# Patient Record
Sex: FEMALE | Race: BLACK OR AFRICAN AMERICAN | URBAN - METROPOLITAN AREA
[De-identification: names, ages, dates, MRNs, and addresses within clinical notes are randomized per-mention and may not be internally consistent; named-entity substitution may affect disease eponyms.]

---

## 2017-03-08 ENCOUNTER — OP HISTORICAL/CONVERTED ENCOUNTER (OUTPATIENT)
Dept: OTHER | Age: 57
End: 2017-03-08

## 2020-02-10 ENCOUNTER — OP HISTORICAL/CONVERTED ENCOUNTER (OUTPATIENT)
Dept: OTHER | Age: 60
End: 2020-02-10

## 2025-07-30 ENCOUNTER — HOSPITAL ENCOUNTER (INPATIENT)
Facility: HOSPITAL | Age: 65
LOS: 3 days | Discharge: HOME HEALTH | End: 2025-08-02
Admitting: STUDENT IN AN ORGANIZED HEALTH CARE EDUCATION/TRAINING PROGRAM
Payer: MEDICARE

## 2025-07-30 ENCOUNTER — APPOINTMENT (OUTPATIENT)
Facility: HOSPITAL | Age: 65
End: 2025-07-30
Payer: MEDICARE

## 2025-07-30 DIAGNOSIS — E11.65 HYPERGLYCEMIA DUE TO DIABETES MELLITUS (HCC): Primary | ICD-10-CM

## 2025-07-30 DIAGNOSIS — E11.65 TYPE 2 DIABETES MELLITUS WITH HYPERGLYCEMIA (HCC): ICD-10-CM

## 2025-07-30 DIAGNOSIS — E87.29 HIGH ANION GAP METABOLIC ACIDOSIS: ICD-10-CM

## 2025-07-30 PROBLEM — E13.10 SECONDARY DM WITH DKA, UNCONTROLLED (HCC): Status: ACTIVE | Noted: 2025-07-30

## 2025-07-30 LAB
ALBUMIN SERPL-MCNC: 3.6 G/DL (ref 3.5–5)
ALBUMIN/GLOB SERPL: 0.9 (ref 1.1–2.2)
ALP SERPL-CCNC: 190 U/L (ref 45–117)
ALT SERPL-CCNC: 37 U/L (ref 12–78)
ANION GAP SERPL CALC-SCNC: 16 MMOL/L (ref 2–12)
ANION GAP SERPL CALC-SCNC: 18 MMOL/L (ref 2–12)
APPEARANCE UR: CLEAR
AST SERPL W P-5'-P-CCNC: 38 U/L (ref 15–37)
BACTERIA URNS QL MICRO: NEGATIVE /HPF
BASE DEFICIT BLDV-SCNC: 2 MMOL/L
BASOPHILS # BLD: 0.02 K/UL (ref 0–0.1)
BASOPHILS NFR BLD: 0.3 % (ref 0–1)
BILIRUB SERPL-MCNC: 0.8 MG/DL (ref 0.2–1)
BILIRUB UR QL: NEGATIVE
BUN SERPL-MCNC: 20 MG/DL (ref 6–20)
BUN SERPL-MCNC: 20 MG/DL (ref 6–20)
BUN/CREAT SERPL: 11 (ref 12–20)
BUN/CREAT SERPL: 14 (ref 12–20)
CA-I BLD-MCNC: 9.4 MG/DL (ref 8.5–10.1)
CA-I BLD-MCNC: 9.4 MG/DL (ref 8.5–10.1)
CHLORIDE SERPL-SCNC: 101 MMOL/L (ref 97–108)
CHLORIDE SERPL-SCNC: 96 MMOL/L (ref 97–108)
CO2 SERPL-SCNC: 18 MMOL/L (ref 21–32)
CO2 SERPL-SCNC: 19 MMOL/L (ref 21–32)
COLOR UR: ABNORMAL
CREAT SERPL-MCNC: 1.4 MG/DL (ref 0.55–1.02)
CREAT SERPL-MCNC: 1.81 MG/DL (ref 0.55–1.02)
DIFFERENTIAL METHOD BLD: ABNORMAL
EOSINOPHIL # BLD: 0.1 K/UL (ref 0–0.4)
EOSINOPHIL NFR BLD: 1.5 % (ref 0–7)
EPITH CASTS URNS QL MICRO: ABNORMAL /LPF
ERYTHROCYTE [DISTWIDTH] IN BLOOD BY AUTOMATED COUNT: 14 % (ref 11.5–14.5)
GLOBULIN SER CALC-MCNC: 3.8 G/DL (ref 2–4)
GLUCOSE BLD STRIP.AUTO-MCNC: 301 MG/DL (ref 65–100)
GLUCOSE BLD STRIP.AUTO-MCNC: 352 MG/DL (ref 65–100)
GLUCOSE BLD STRIP.AUTO-MCNC: 368 MG/DL (ref 65–100)
GLUCOSE BLD STRIP.AUTO-MCNC: 371 MG/DL (ref 65–100)
GLUCOSE BLD STRIP.AUTO-MCNC: 433 MG/DL (ref 65–100)
GLUCOSE BLD STRIP.AUTO-MCNC: 438 MG/DL (ref 65–100)
GLUCOSE BLD STRIP.AUTO-MCNC: 440 MG/DL (ref 65–100)
GLUCOSE BLD STRIP.AUTO-MCNC: 552 MG/DL (ref 65–100)
GLUCOSE BLD STRIP.AUTO-MCNC: 593 MG/DL (ref 65–100)
GLUCOSE SERPL-MCNC: 445 MG/DL (ref 65–100)
GLUCOSE SERPL-MCNC: 593 MG/DL (ref 65–100)
GLUCOSE UR STRIP.AUTO-MCNC: >500 MG/DL
HCO3 BLDV-SCNC: 19.1 MMOL/L (ref 22–26)
HCT VFR BLD AUTO: 43 % (ref 35–47)
HGB BLD-MCNC: 14.2 G/DL (ref 11.5–16)
HGB UR QL STRIP: NEGATIVE
IMM GRANULOCYTES # BLD AUTO: 0.09 K/UL (ref 0–0.04)
IMM GRANULOCYTES NFR BLD AUTO: 1.4 % (ref 0–0.5)
KETONES UR QL STRIP.AUTO: 5 MG/DL
LACTATE BLD-SCNC: 4.2 MMOL/L (ref 0.4–2)
LACTATE SERPL-SCNC: 4.1 MMOL/L (ref 0.4–2)
LEUKOCYTE ESTERASE UR QL STRIP.AUTO: NEGATIVE
LYMPHOCYTES # BLD: 1.54 K/UL (ref 0.8–3.5)
LYMPHOCYTES NFR BLD: 23.1 % (ref 12–49)
MAGNESIUM SERPL-MCNC: 1.9 MG/DL (ref 1.6–2.4)
MCH RBC QN AUTO: 27.5 PG (ref 26–34)
MCHC RBC AUTO-ENTMCNC: 33 G/DL (ref 30–36.5)
MCV RBC AUTO: 83.2 FL (ref 80–99)
MONOCYTES # BLD: 0.69 K/UL (ref 0–1)
MONOCYTES NFR BLD: 10.4 % (ref 5–13)
NEUTS SEG # BLD: 4.22 K/UL (ref 1.8–8)
NEUTS SEG NFR BLD: 63.3 % (ref 32–75)
NITRITE UR QL STRIP.AUTO: NEGATIVE
NRBC # BLD: 0 K/UL (ref 0–0.01)
NRBC BLD-RTO: 0 PER 100 WBC
PCO2 BLDV: 24.4 MMHG (ref 41–52)
PERFORMED BY:: ABNORMAL
PH BLDV: 7.5 (ref 7.23–7.44)
PH UR STRIP: 5 (ref 5–8)
PLATELET # BLD AUTO: 347 K/UL (ref 150–400)
PMV BLD AUTO: 10.1 FL (ref 8.9–12.9)
PO2 BLDV: 47 MMHG (ref 25–40)
POTASSIUM SERPL-SCNC: 3.4 MMOL/L (ref 3.5–5.1)
POTASSIUM SERPL-SCNC: 3.6 MMOL/L (ref 3.5–5.1)
PROT SERPL-MCNC: 7.4 G/DL (ref 6.4–8.2)
PROT UR STRIP-MCNC: NEGATIVE MG/DL
RBC # BLD AUTO: 5.17 M/UL (ref 3.8–5.2)
RBC #/AREA URNS HPF: ABNORMAL /HPF (ref 0–5)
SAO2 % BLDV: 87.3 %
SODIUM SERPL-SCNC: 132 MMOL/L (ref 136–145)
SODIUM SERPL-SCNC: 136 MMOL/L (ref 136–145)
SP GR UR REFRACTOMETRY: 1.02 (ref 1–1.03)
SPECIMEN SITE: ABNORMAL
SPECIMEN TYPE: ABNORMAL
TROPONIN I SERPL HS-MCNC: 16 NG/L (ref 0–51)
URINE CULTURE IF INDICATED: ABNORMAL
UROBILINOGEN UR QL STRIP.AUTO: 0.1 EU/DL (ref 0.1–1)
WBC # BLD AUTO: 6.7 K/UL (ref 3.6–11)
WBC URNS QL MICRO: ABNORMAL /HPF (ref 0–4)

## 2025-07-30 PROCEDURE — 83930 ASSAY OF BLOOD OSMOLALITY: CPT

## 2025-07-30 PROCEDURE — 82962 GLUCOSE BLOOD TEST: CPT

## 2025-07-30 PROCEDURE — 6370000000 HC RX 637 (ALT 250 FOR IP): Performed by: STUDENT IN AN ORGANIZED HEALTH CARE EDUCATION/TRAINING PROGRAM

## 2025-07-30 PROCEDURE — 83605 ASSAY OF LACTIC ACID: CPT

## 2025-07-30 PROCEDURE — 2500000003 HC RX 250 WO HCPCS: Performed by: STUDENT IN AN ORGANIZED HEALTH CARE EDUCATION/TRAINING PROGRAM

## 2025-07-30 PROCEDURE — 80048 BASIC METABOLIC PNL TOTAL CA: CPT

## 2025-07-30 PROCEDURE — 87641 MR-STAPH DNA AMP PROBE: CPT

## 2025-07-30 PROCEDURE — 85025 COMPLETE CBC W/AUTO DIFF WBC: CPT

## 2025-07-30 PROCEDURE — 99285 EMERGENCY DEPT VISIT HI MDM: CPT

## 2025-07-30 PROCEDURE — 83735 ASSAY OF MAGNESIUM: CPT

## 2025-07-30 PROCEDURE — 71045 X-RAY EXAM CHEST 1 VIEW: CPT

## 2025-07-30 PROCEDURE — 80047 BASIC METABLC PNL IONIZED CA: CPT

## 2025-07-30 PROCEDURE — 73560 X-RAY EXAM OF KNEE 1 OR 2: CPT

## 2025-07-30 PROCEDURE — 84484 ASSAY OF TROPONIN QUANT: CPT

## 2025-07-30 PROCEDURE — 81001 URINALYSIS AUTO W/SCOPE: CPT

## 2025-07-30 PROCEDURE — 96360 HYDRATION IV INFUSION INIT: CPT

## 2025-07-30 PROCEDURE — 96361 HYDRATE IV INFUSION ADD-ON: CPT

## 2025-07-30 PROCEDURE — 2580000003 HC RX 258

## 2025-07-30 PROCEDURE — 2580000003 HC RX 258: Performed by: STUDENT IN AN ORGANIZED HEALTH CARE EDUCATION/TRAINING PROGRAM

## 2025-07-30 PROCEDURE — 6360000002 HC RX W HCPCS: Performed by: STUDENT IN AN ORGANIZED HEALTH CARE EDUCATION/TRAINING PROGRAM

## 2025-07-30 PROCEDURE — 80053 COMPREHEN METABOLIC PANEL: CPT

## 2025-07-30 PROCEDURE — 82010 KETONE BODYS QUAN: CPT

## 2025-07-30 PROCEDURE — 2000000000 HC ICU R&B

## 2025-07-30 RX ORDER — INSULIN LISPRO 100 [IU]/ML
10 INJECTION, SOLUTION INTRAVENOUS; SUBCUTANEOUS
Status: ON HOLD | COMMUNITY
End: 2025-08-02 | Stop reason: HOSPADM

## 2025-07-30 RX ORDER — ACETAMINOPHEN 650 MG/1
650 SUPPOSITORY RECTAL EVERY 6 HOURS PRN
Status: DISCONTINUED | OUTPATIENT
Start: 2025-07-30 | End: 2025-08-02 | Stop reason: HOSPADM

## 2025-07-30 RX ORDER — HEPARIN SODIUM 5000 [USP'U]/ML
5000 INJECTION, SOLUTION INTRAVENOUS; SUBCUTANEOUS EVERY 8 HOURS SCHEDULED
Status: DISCONTINUED | OUTPATIENT
Start: 2025-07-30 | End: 2025-08-02 | Stop reason: HOSPADM

## 2025-07-30 RX ORDER — DEXTROSE MONOHYDRATE 100 MG/ML
INJECTION, SOLUTION INTRAVENOUS CONTINUOUS PRN
Status: DISCONTINUED | OUTPATIENT
Start: 2025-07-30 | End: 2025-08-02 | Stop reason: HOSPADM

## 2025-07-30 RX ORDER — GLUCAGON 1 MG/ML
1 KIT INJECTION PRN
Status: DISCONTINUED | OUTPATIENT
Start: 2025-07-30 | End: 2025-07-31

## 2025-07-30 RX ORDER — CLONIDINE HYDROCHLORIDE 0.3 MG/1
0.3 TABLET ORAL 3 TIMES DAILY
Status: ON HOLD | COMMUNITY
Start: 2025-07-01 | End: 2025-08-01

## 2025-07-30 RX ORDER — ONDANSETRON 2 MG/ML
4 INJECTION INTRAMUSCULAR; INTRAVENOUS EVERY 6 HOURS PRN
Status: DISCONTINUED | OUTPATIENT
Start: 2025-07-30 | End: 2025-08-02 | Stop reason: HOSPADM

## 2025-07-30 RX ORDER — ENOXAPARIN SODIUM 100 MG/ML
30 INJECTION SUBCUTANEOUS 2 TIMES DAILY
Status: DISCONTINUED | OUTPATIENT
Start: 2025-07-30 | End: 2025-07-30

## 2025-07-30 RX ORDER — ASPIRIN 81 MG/1
81 TABLET ORAL DAILY
COMMUNITY

## 2025-07-30 RX ORDER — INSULIN LISPRO 100 [IU]/ML
0-8 INJECTION, SOLUTION INTRAVENOUS; SUBCUTANEOUS
Status: DISCONTINUED | OUTPATIENT
Start: 2025-07-30 | End: 2025-07-31

## 2025-07-30 RX ORDER — ASPIRIN 81 MG/1
81 TABLET, CHEWABLE ORAL DAILY
Status: DISCONTINUED | OUTPATIENT
Start: 2025-07-30 | End: 2025-08-02 | Stop reason: HOSPADM

## 2025-07-30 RX ORDER — SODIUM CHLORIDE 9 MG/ML
INJECTION, SOLUTION INTRAVENOUS PRN
Status: DISCONTINUED | OUTPATIENT
Start: 2025-07-30 | End: 2025-08-02 | Stop reason: HOSPADM

## 2025-07-30 RX ORDER — AMLODIPINE BESYLATE 10 MG/1
10 TABLET ORAL DAILY
COMMUNITY
Start: 2025-07-29

## 2025-07-30 RX ORDER — ONDANSETRON 4 MG/1
4 TABLET, ORALLY DISINTEGRATING ORAL EVERY 8 HOURS PRN
Status: DISCONTINUED | OUTPATIENT
Start: 2025-07-30 | End: 2025-08-02 | Stop reason: HOSPADM

## 2025-07-30 RX ORDER — MAGNESIUM SULFATE IN WATER 40 MG/ML
2000 INJECTION, SOLUTION INTRAVENOUS PRN
Status: DISCONTINUED | OUTPATIENT
Start: 2025-07-30 | End: 2025-07-31

## 2025-07-30 RX ORDER — ACETAMINOPHEN 325 MG/1
650 TABLET ORAL EVERY 6 HOURS PRN
Status: DISCONTINUED | OUTPATIENT
Start: 2025-07-30 | End: 2025-08-02 | Stop reason: HOSPADM

## 2025-07-30 RX ORDER — INSULIN GLARGINE 100 [IU]/ML
15 INJECTION, SOLUTION SUBCUTANEOUS 2 TIMES DAILY
Status: DISCONTINUED | OUTPATIENT
Start: 2025-07-30 | End: 2025-07-31

## 2025-07-30 RX ORDER — POLYETHYLENE GLYCOL 3350 17 G/17G
17 POWDER, FOR SOLUTION ORAL DAILY PRN
Status: DISCONTINUED | OUTPATIENT
Start: 2025-07-30 | End: 2025-08-02 | Stop reason: HOSPADM

## 2025-07-30 RX ORDER — 0.9 % SODIUM CHLORIDE 0.9 %
1000 INTRAVENOUS SOLUTION INTRAVENOUS ONCE
Status: COMPLETED | OUTPATIENT
Start: 2025-07-30 | End: 2025-07-30

## 2025-07-30 RX ORDER — AMLODIPINE BESYLATE 5 MG/1
10 TABLET ORAL DAILY
Status: DISCONTINUED | OUTPATIENT
Start: 2025-07-30 | End: 2025-08-02 | Stop reason: HOSPADM

## 2025-07-30 RX ORDER — POTASSIUM CHLORIDE 1500 MG/1
20 TABLET, EXTENDED RELEASE ORAL ONCE
Status: COMPLETED | OUTPATIENT
Start: 2025-07-30 | End: 2025-07-30

## 2025-07-30 RX ORDER — MONTELUKAST SODIUM 4 MG/1
4 TABLET, CHEWABLE ORAL NIGHTLY
Status: DISCONTINUED | OUTPATIENT
Start: 2025-07-30 | End: 2025-07-30

## 2025-07-30 RX ORDER — METOPROLOL TARTRATE 100 MG/1
150 TABLET ORAL 2 TIMES DAILY
Status: ON HOLD | COMMUNITY
Start: 2025-07-29 | End: 2025-08-01 | Stop reason: HOSPADM

## 2025-07-30 RX ORDER — SODIUM CHLORIDE 0.9 % (FLUSH) 0.9 %
5-40 SYRINGE (ML) INJECTION PRN
Status: DISCONTINUED | OUTPATIENT
Start: 2025-07-30 | End: 2025-08-02 | Stop reason: HOSPADM

## 2025-07-30 RX ORDER — SODIUM CHLORIDE 0.9 % (FLUSH) 0.9 %
5-40 SYRINGE (ML) INJECTION EVERY 12 HOURS SCHEDULED
Status: DISCONTINUED | OUTPATIENT
Start: 2025-07-30 | End: 2025-08-02 | Stop reason: HOSPADM

## 2025-07-30 RX ORDER — CHLORTHALIDONE 25 MG/1
25 TABLET ORAL DAILY
Status: ON HOLD | COMMUNITY
End: 2025-08-01 | Stop reason: HOSPADM

## 2025-07-30 RX ORDER — ATORVASTATIN CALCIUM 10 MG/1
20 TABLET, FILM COATED ORAL NIGHTLY
Status: DISCONTINUED | OUTPATIENT
Start: 2025-07-30 | End: 2025-08-02 | Stop reason: HOSPADM

## 2025-07-30 RX ORDER — POTASSIUM CHLORIDE 7.45 MG/ML
10 INJECTION INTRAVENOUS PRN
Status: DISCONTINUED | OUTPATIENT
Start: 2025-07-30 | End: 2025-07-31

## 2025-07-30 RX ORDER — SODIUM CHLORIDE, SODIUM LACTATE, POTASSIUM CHLORIDE, AND CALCIUM CHLORIDE .6; .31; .03; .02 G/100ML; G/100ML; G/100ML; G/100ML
2000 INJECTION, SOLUTION INTRAVENOUS ONCE
Status: COMPLETED | OUTPATIENT
Start: 2025-07-30 | End: 2025-07-30

## 2025-07-30 RX ORDER — ATORVASTATIN CALCIUM 20 MG/1
20 TABLET, FILM COATED ORAL EVERY EVENING
COMMUNITY
Start: 2025-07-01

## 2025-07-30 RX ORDER — MONTELUKAST SODIUM 5 MG/1
5 TABLET, CHEWABLE ORAL NIGHTLY
Status: DISCONTINUED | OUTPATIENT
Start: 2025-07-30 | End: 2025-08-02 | Stop reason: HOSPADM

## 2025-07-30 RX ORDER — INSULIN GLARGINE 100 [IU]/ML
10 INJECTION, SOLUTION SUBCUTANEOUS 2 TIMES DAILY
Status: ON HOLD | COMMUNITY
Start: 2025-07-29 | End: 2025-08-01

## 2025-07-30 RX ORDER — POTASSIUM CHLORIDE 29.8 MG/ML
20 INJECTION INTRAVENOUS PRN
Status: DISCONTINUED | OUTPATIENT
Start: 2025-07-30 | End: 2025-07-31

## 2025-07-30 RX ADMIN — POTASSIUM CHLORIDE 20 MEQ: 1500 TABLET, EXTENDED RELEASE ORAL at 18:44

## 2025-07-30 RX ADMIN — ONDANSETRON 4 MG: 2 INJECTION, SOLUTION INTRAMUSCULAR; INTRAVENOUS at 18:25

## 2025-07-30 RX ADMIN — ATORVASTATIN CALCIUM 20 MG: 20 TABLET, FILM COATED ORAL at 21:36

## 2025-07-30 RX ADMIN — HEPARIN SODIUM 5000 UNITS: 5000 INJECTION, SOLUTION INTRAVENOUS; SUBCUTANEOUS at 21:37

## 2025-07-30 RX ADMIN — AMLODIPINE BESYLATE 10 MG: 5 TABLET ORAL at 18:44

## 2025-07-30 RX ADMIN — SODIUM BICARBONATE: 84 INJECTION, SOLUTION INTRAVENOUS at 21:09

## 2025-07-30 RX ADMIN — SODIUM CHLORIDE, PRESERVATIVE FREE 10 ML: 5 INJECTION INTRAVENOUS at 21:38

## 2025-07-30 RX ADMIN — SODIUM CHLORIDE 1000 ML: 0.9 INJECTION, SOLUTION INTRAVENOUS at 14:39

## 2025-07-30 RX ADMIN — INSULIN LISPRO 8 UNITS: 100 INJECTION, SOLUTION INTRAVENOUS; SUBCUTANEOUS at 21:37

## 2025-07-30 RX ADMIN — SODIUM CHLORIDE, SODIUM LACTATE, POTASSIUM CHLORIDE, AND CALCIUM CHLORIDE 2000 ML: .6; .31; .03; .02 INJECTION, SOLUTION INTRAVENOUS at 18:32

## 2025-07-30 RX ADMIN — MONTELUKAST SODIUM 5 MG: 5 TABLET, CHEWABLE ORAL at 21:36

## 2025-07-30 RX ADMIN — HEPARIN SODIUM 5000 UNITS: 5000 INJECTION, SOLUTION INTRAVENOUS; SUBCUTANEOUS at 18:36

## 2025-07-30 RX ADMIN — ASPIRIN 81 MG: 81 TABLET, CHEWABLE ORAL at 18:44

## 2025-07-30 RX ADMIN — INSULIN GLARGINE 15 UNITS: 100 INJECTION, SOLUTION SUBCUTANEOUS at 21:36

## 2025-07-30 ASSESSMENT — LIFESTYLE VARIABLES
HOW OFTEN DO YOU HAVE A DRINK CONTAINING ALCOHOL: NEVER
HOW MANY STANDARD DRINKS CONTAINING ALCOHOL DO YOU HAVE ON A TYPICAL DAY: PATIENT DOES NOT DRINK

## 2025-07-30 ASSESSMENT — PAIN SCALES - GENERAL
PAINLEVEL_OUTOF10: 0
PAINLEVEL_OUTOF10: 0

## 2025-07-30 NOTE — ED NOTES
ED TO INPATIENT SBAR HANDOFF     Patient Name: Geetha Emery   Preferred Name: Geetha  : 1960  65 y.o.             Family/Caregiver Present: yes   Code Status Order: Full Code  PO Status:[unfilled]  Telemetry Order: Yes  C-SSRS: Risk of Suicide: No Risk  Sitter no   Restraints:    Sepsis Risk Score Sepsis V2 Risk Score: 15.8    Situation:  Chief Complaint   Patient presents with    Hyperglycemia    Fall     Brief Description of Patient's Condition: COPD exacerbation with possible pneumonia. Pt seen for trip on rug, hyperglycemia, non med compliance. Ambulated to bathroom with one assist, no wounds, from home uses cane at times.  Mental Status: oriented  Arrived from: Home  Abnormal labs:   Abnormal Labs Reviewed   CBC WITH AUTO DIFFERENTIAL - Abnormal; Notable for the following components:       Result Value    Immature Granulocytes % 1.4 (*)     Immature Granulocytes Absolute 0.09 (*)     All other components within normal limits   BASIC METABOLIC PANEL - Abnormal; Notable for the following components:    Sodium 132 (*)     Potassium 3.4 (*)     Chloride 96 (*)     CO2 18 (*)     Anion Gap 18 (*)     Glucose 593 (*)     Creatinine 1.81 (*)     BUN/Creatinine Ratio 11 (*)     Est, Glom Filt Rate 30 (*)     All other components within normal limits   URINALYSIS WITH REFLEX TO CULTURE - Abnormal; Notable for the following components:    Glucose, Ur >500 (*)     Ketones, Urine 5 (*)     All other components within normal limits   POCT GLUCOSE - Abnormal; Notable for the following components:    POC Glucose 552 (*)     All other components within normal limits   VENOUS BLOOD GAS, POINT OF CARE - Abnormal; Notable for the following components:    PH, VENOUS (POC) 7.50 (*)     PCO2, [place], POC 24.4 (*)     PO2, VENOUS (POC) 47 (*)     HCO3, Venous 19.1 (*)     All other components within normal limits   POC CHEMISTRY (NA,K,ICA,GLU,CALC HCT/HGB,LACTATE,CREA,CL) - Abnormal; Notable for the following components:    POC  Glucose 593 (*)     POC Lactic Acid 4.20 (*)     All other components within normal limits   POCT GLUCOSE - Abnormal; Notable for the following components:    POC Glucose 433 (*)     All other components within normal limits        Background:  Allergies: No Known Allergies  History: History reviewed. No pertinent past medical history.     Assessment:  Vitals/MEWS:        Vitals:    07/30/25 1400 07/30/25 1407 07/30/25 1600 07/30/25 1615   BP: (!) 140/86 (!) 157/89 139/75 (!) 128/59   Pulse: 71 77 78 68   Resp: 16 18 14 15   Temp:       SpO2: 99% 98% 100% 98%   Weight:       Height:         Cardiac Rhythm:       Deterioration Index:  O2 Flow Rate:    O2 Device: O2 Device: None (Room air)  Critical Lab Results: [unfilled]  Cultures:   NIH Score: NIH     Active LDA's:   Patient Lines/Drains/Airways Status       Active LDAs       Name Placement date Placement time Site Days    Peripheral IV 07/30/25 Left Antecubital 07/30/25  --  Antecubital  less than 1                  Active Central Lines:                            Active Wounds:    Active Del Valle's:    Active Feeding Tubes:        Administered Medications:   Medications   sodium chloride flush 0.9 % injection 5-40 mL (has no administration in time range)   sodium chloride flush 0.9 % injection 5-40 mL (has no administration in time range)   0.9 % sodium chloride infusion (has no administration in time range)   potassium chloride 20 mEq/50 mL IVPB (Central Line) (has no administration in time range)     Or   potassium chloride 10 mEq/100 mL IVPB (Peripheral Line) (has no administration in time range)   magnesium sulfate 2000 mg in 50 mL IVPB premix (has no administration in time range)   ondansetron (ZOFRAN-ODT) disintegrating tablet 4 mg (has no administration in time range)     Or   ondansetron (ZOFRAN) injection 4 mg (has no administration in time range)   polyethylene glycol (GLYCOLAX) packet 17 g (has no administration in time range)   acetaminophen (TYLENOL)

## 2025-07-30 NOTE — H&P
the decision-making and personally managed or directed the management of the following life and organ supporting interventions that required my frequent assessment to treat or prevent imminent deterioration.    I personally spent 60 minutes of critical care time.  This is time spent at this critically ill patient's bedside actively involved in patient care as well as the coordination of care.  This does not include any procedural time which has been billed separately.    Dr. Surjit Malagon  Intensivist

## 2025-07-30 NOTE — ED PROVIDER NOTES
[facility] EMERGENCY DEPT  EMERGENCY DEPARTMENT HISTORY AND PHYSICAL EXAM      Date of evaluation: 7/30/2025  Patient Name: Geetha Emery  Birthdate 1960  MRN: [id number]  ED Provider: Celena Clifton MD   Note Started: 11:43 AM EDT 7/30/25    HISTORY OF PRESENT ILLNESS     Chief Complaint   Patient presents with    Hyperglycemia    Fall       History Provided By: Patient, EMS     HPI: Geetha Emery is a 65 y.o. female with history of poorly controlled diabetes and hypertension presents to hospital via EMS after patient sustained a mechanical ground-level fall today and was not able to get up on her own.  Patient reports that her foot caught a rug in her home causing her to fall, no sensation of dizziness, lightheadedness, headache, chest pain occurring prior to fall.  Patient is a very poor historian.  Per EMS patient had glucose reading high on their arrival.  Patient unable to state which medication she takes but states that she has not been taking her NovoLog and has been taking her Lantus \"3 times per day\" but then later states that she is not taking it at all.  She states that she did not strike her head, not on blood thinners, no LOC.  She endorses mild discomfort to her left knee however states that she is unable to bear weight and walk to the restroom without difficulty without pain.  Denies any infectious symptoms including fever, chills, chest pain, shortness of breath, abdominal pain, nausea, vomiting.  States that she has no other complaints at this time.    PAST MEDICAL HISTORY   Past Medical History:  History reviewed. No pertinent past medical history.    Past Surgical History:  History reviewed. No pertinent surgical history.    Family History:  History reviewed. No pertinent family history.    Social History:  Social History     Tobacco Use    Smoking status: Never    Smokeless tobacco: Never   Vaping Use    Vaping status: Never Used   Substance Use Topics    Alcohol use: Never    Drug use: Never        Allergies:  No Known Allergies    PCP: Berkley Rodarte MD    Current Meds:   Current Facility-Administered Medications   Medication Dose Route Frequency Provider Last Rate Last Admin    sodium chloride flush 0.9 % injection 5-40 mL  5-40 mL IntraVENous 2 times per day Surjit Malagon MD        sodium chloride flush 0.9 % injection 5-40 mL  5-40 mL IntraVENous PRN Surjit Malagon MD        0.9 % sodium chloride infusion   IntraVENous PRN Surjit Malagon MD        potassium chloride 20 mEq/50 mL IVPB (Central Line)  20 mEq IntraVENous PRN Surjit Malagon MD        Or    potassium chloride 10 mEq/100 mL IVPB (Peripheral Line)  10 mEq IntraVENous PRN Surjit Malagon MD        magnesium sulfate 2000 mg in 50 mL IVPB premix  2,000 mg IntraVENous PRN Surjit Malagon MD        ondansetron (ZOFRAN-ODT) disintegrating tablet 4 mg  4 mg Oral Q8H PRN Surjit Malagon MD        Or    ondansetron (ZOFRAN) injection 4 mg  4 mg IntraVENous Q6H PRN Surjit Malagon MD   4 mg at 07/30/25 1825    polyethylene glycol (GLYCOLAX) packet 17 g  17 g Oral Daily PRN Surjit Malagon MD        acetaminophen (TYLENOL) tablet 650 mg  650 mg Oral Q6H PRN Surjit Malagon MD        Or    acetaminophen (TYLENOL) suppository 650 mg  650 mg Rectal Q6H PRN Surjit Malagon MD        heparin (porcine) injection 5,000 Units  5,000 Units SubCUTAneous 3 times per day Surjit Malagon MD   5,000 Units at 07/30/25 1836    sodium bicarbonate 150 mEq in sterile water 1,000 mL infusion   IntraVENous Continuous Surjit Malagon MD        lactated ringers bolus 2,000 mL  2,000 mL IntraVENous Once Surjit Malagon .7 mL/hr at 07/30/25 1832 2,000 mL at 07/30/25 1832    glucose chewable tablet 16 g  4 tablet Oral PRN Surjit Malagon MD        dextrose bolus 10% 125 mL  125 mL IntraVENous PRN Surjit Malagon MD        Or    dextrose bolus 10% 250 mL  250 mL IntraVENous PRN Surjit Malagon MD        glucagon injection 1 mg  1 mg SubCUTAneous PRN Surjit Malagon MD        dextrose 10 % infusion   IntraVENous

## 2025-07-30 NOTE — ED TRIAGE NOTES
Per EMS pt stated her foot got caught in the carpet/rug, no LOC did not hit head. Pt had med bottles set on counters, reported to EMS she has not been taking meds or checking BGL.  20G Rt wrist w saline bouls.     BGL HI, recheck of 598

## 2025-07-31 LAB
ALBUMIN SERPL-MCNC: 2.9 G/DL (ref 3.5–5)
ALBUMIN SERPL-MCNC: 3 G/DL (ref 3.5–5)
ALBUMIN/GLOB SERPL: 0.7 (ref 1.1–2.2)
ALBUMIN/GLOB SERPL: 0.8 (ref 1.1–2.2)
ALP SERPL-CCNC: 158 U/L (ref 45–117)
ALP SERPL-CCNC: 161 U/L (ref 45–117)
ALT SERPL-CCNC: 34 U/L (ref 12–78)
ALT SERPL-CCNC: 35 U/L (ref 12–78)
ANION GAP SERPL CALC-SCNC: 12 MMOL/L (ref 2–12)
ANION GAP SERPL CALC-SCNC: 12 MMOL/L (ref 2–12)
ANION GAP SERPL CALC-SCNC: 13 MMOL/L (ref 2–12)
ANION GAP SERPL CALC-SCNC: 14 MMOL/L (ref 2–12)
APAP SERPL-MCNC: <2 UG/ML (ref 10–30)
AST SERPL W P-5'-P-CCNC: 32 U/L (ref 15–37)
AST SERPL W P-5'-P-CCNC: 36 U/L (ref 15–37)
BILIRUB SERPL-MCNC: 0.7 MG/DL (ref 0.2–1)
BILIRUB SERPL-MCNC: 0.7 MG/DL (ref 0.2–1)
BUN SERPL-MCNC: 10 MG/DL (ref 6–20)
BUN SERPL-MCNC: 11 MG/DL (ref 6–20)
BUN SERPL-MCNC: 14 MG/DL (ref 6–20)
BUN SERPL-MCNC: 17 MG/DL (ref 6–20)
BUN/CREAT SERPL: 10 (ref 12–20)
BUN/CREAT SERPL: 12 (ref 12–20)
BUN/CREAT SERPL: 14 (ref 12–20)
BUN/CREAT SERPL: 7 (ref 12–20)
CA-I BLD-MCNC: 9 MG/DL (ref 8.5–10.1)
CA-I BLD-MCNC: 9 MG/DL (ref 8.5–10.1)
CA-I BLD-MCNC: 9.1 MG/DL (ref 8.5–10.1)
CA-I BLD-MCNC: 9.4 MG/DL (ref 8.5–10.1)
CHLORIDE SERPL-SCNC: 101 MMOL/L (ref 97–108)
CHLORIDE SERPL-SCNC: 104 MMOL/L (ref 97–108)
CHLORIDE SERPL-SCNC: 96 MMOL/L (ref 97–108)
CHLORIDE SERPL-SCNC: 98 MMOL/L (ref 97–108)
CO2 SERPL-SCNC: 21 MMOL/L (ref 21–32)
CO2 SERPL-SCNC: 22 MMOL/L (ref 21–32)
CO2 SERPL-SCNC: 26 MMOL/L (ref 21–32)
CO2 SERPL-SCNC: 26 MMOL/L (ref 21–32)
CREAT SERPL-MCNC: 1.05 MG/DL (ref 0.55–1.02)
CREAT SERPL-MCNC: 1.15 MG/DL (ref 0.55–1.02)
CREAT SERPL-MCNC: 1.2 MG/DL (ref 0.55–1.02)
CREAT SERPL-MCNC: 1.35 MG/DL (ref 0.55–1.02)
DATE LAST DOSE: ABNORMAL
DATE LAST DOSE: ABNORMAL
DOSE AMOUNT: ABNORMAL UNITS
DOSE AMOUNT: ABNORMAL UNITS
ERYTHROCYTE [DISTWIDTH] IN BLOOD BY AUTOMATED COUNT: 14 % (ref 11.5–14.5)
EST. AVERAGE GLUCOSE BLD GHB EST-MCNC: 361 MG/DL
GLOBULIN SER CALC-MCNC: 3.8 G/DL (ref 2–4)
GLOBULIN SER CALC-MCNC: 4.1 G/DL (ref 2–4)
GLUCOSE BLD STRIP.AUTO-MCNC: 228 MG/DL (ref 65–100)
GLUCOSE BLD STRIP.AUTO-MCNC: 230 MG/DL (ref 65–100)
GLUCOSE BLD STRIP.AUTO-MCNC: 232 MG/DL (ref 65–100)
GLUCOSE BLD STRIP.AUTO-MCNC: 233 MG/DL (ref 65–100)
GLUCOSE BLD STRIP.AUTO-MCNC: 237 MG/DL (ref 65–100)
GLUCOSE BLD STRIP.AUTO-MCNC: 240 MG/DL (ref 65–100)
GLUCOSE BLD STRIP.AUTO-MCNC: 242 MG/DL (ref 65–100)
GLUCOSE BLD STRIP.AUTO-MCNC: 251 MG/DL (ref 65–100)
GLUCOSE BLD STRIP.AUTO-MCNC: 256 MG/DL (ref 65–100)
GLUCOSE BLD STRIP.AUTO-MCNC: 265 MG/DL (ref 65–100)
GLUCOSE BLD STRIP.AUTO-MCNC: 268 MG/DL (ref 65–100)
GLUCOSE BLD STRIP.AUTO-MCNC: 276 MG/DL (ref 65–100)
GLUCOSE SERPL-MCNC: 244 MG/DL (ref 65–100)
GLUCOSE SERPL-MCNC: 248 MG/DL (ref 65–100)
GLUCOSE SERPL-MCNC: 255 MG/DL (ref 65–100)
GLUCOSE SERPL-MCNC: 323 MG/DL (ref 65–100)
HBA1C MFR BLD: 14.2 % (ref 4–5.6)
HCT VFR BLD AUTO: 42.8 % (ref 35–47)
HGB BLD-MCNC: 14.2 G/DL (ref 11.5–16)
LACTATE SERPL-SCNC: 2.6 MMOL/L (ref 0.4–2)
LACTATE SERPL-SCNC: 3 MMOL/L (ref 0.4–2)
LACTATE SERPL-SCNC: 3.6 MMOL/L (ref 0.4–2)
LACTATE SERPL-SCNC: 3.8 MMOL/L (ref 0.4–2)
MAGNESIUM SERPL-MCNC: 1.6 MG/DL (ref 1.6–2.4)
MAGNESIUM SERPL-MCNC: 2 MG/DL (ref 1.6–2.4)
MCH RBC QN AUTO: 27.6 PG (ref 26–34)
MCHC RBC AUTO-ENTMCNC: 33.2 G/DL (ref 30–36.5)
MCV RBC AUTO: 83.1 FL (ref 80–99)
MRSA DNA SPEC QL NAA+PROBE: NOT DETECTED
NRBC # BLD: 0 K/UL (ref 0–0.01)
NRBC BLD-RTO: 0 PER 100 WBC
PERFORMED BY:: ABNORMAL
PHOSPHATE SERPL-MCNC: 2.1 MG/DL (ref 2.6–4.7)
PHOSPHATE SERPL-MCNC: 2.1 MG/DL (ref 2.6–4.7)
PLATELET # BLD AUTO: 309 K/UL (ref 150–400)
PMV BLD AUTO: 9.8 FL (ref 8.9–12.9)
POTASSIUM SERPL-SCNC: 3.5 MMOL/L (ref 3.5–5.1)
POTASSIUM SERPL-SCNC: 3.5 MMOL/L (ref 3.5–5.1)
POTASSIUM SERPL-SCNC: 3.8 MMOL/L (ref 3.5–5.1)
POTASSIUM SERPL-SCNC: 4 MMOL/L (ref 3.5–5.1)
PROT SERPL-MCNC: 6.8 G/DL (ref 6.4–8.2)
PROT SERPL-MCNC: 7 G/DL (ref 6.4–8.2)
RBC # BLD AUTO: 5.15 M/UL (ref 3.8–5.2)
SALICYLATES SERPL-MCNC: <1.7 MG/DL (ref 2.8–20)
SODIUM SERPL-SCNC: 135 MMOL/L (ref 136–145)
SODIUM SERPL-SCNC: 136 MMOL/L (ref 136–145)
SODIUM SERPL-SCNC: 137 MMOL/L (ref 136–145)
SODIUM SERPL-SCNC: 137 MMOL/L (ref 136–145)
WBC # BLD AUTO: 7.6 K/UL (ref 3.6–11)

## 2025-07-31 PROCEDURE — 6370000000 HC RX 637 (ALT 250 FOR IP): Performed by: STUDENT IN AN ORGANIZED HEALTH CARE EDUCATION/TRAINING PROGRAM

## 2025-07-31 PROCEDURE — 83735 ASSAY OF MAGNESIUM: CPT

## 2025-07-31 PROCEDURE — 83605 ASSAY OF LACTIC ACID: CPT

## 2025-07-31 PROCEDURE — 36415 COLL VENOUS BLD VENIPUNCTURE: CPT

## 2025-07-31 PROCEDURE — 80179 DRUG ASSAY SALICYLATE: CPT

## 2025-07-31 PROCEDURE — 82962 GLUCOSE BLOOD TEST: CPT

## 2025-07-31 PROCEDURE — 84100 ASSAY OF PHOSPHORUS: CPT

## 2025-07-31 PROCEDURE — 80053 COMPREHEN METABOLIC PANEL: CPT

## 2025-07-31 PROCEDURE — 80143 DRUG ASSAY ACETAMINOPHEN: CPT

## 2025-07-31 PROCEDURE — 2580000003 HC RX 258: Performed by: NURSE PRACTITIONER

## 2025-07-31 PROCEDURE — 6360000002 HC RX W HCPCS: Performed by: NURSE PRACTITIONER

## 2025-07-31 PROCEDURE — 1100000000 HC RM PRIVATE

## 2025-07-31 PROCEDURE — 6370000000 HC RX 637 (ALT 250 FOR IP): Performed by: INTERNAL MEDICINE

## 2025-07-31 PROCEDURE — 6360000002 HC RX W HCPCS: Performed by: STUDENT IN AN ORGANIZED HEALTH CARE EDUCATION/TRAINING PROGRAM

## 2025-07-31 PROCEDURE — 6370000000 HC RX 637 (ALT 250 FOR IP): Performed by: NURSE PRACTITIONER

## 2025-07-31 PROCEDURE — 2580000003 HC RX 258: Performed by: STUDENT IN AN ORGANIZED HEALTH CARE EDUCATION/TRAINING PROGRAM

## 2025-07-31 PROCEDURE — 83036 HEMOGLOBIN GLYCOSYLATED A1C: CPT

## 2025-07-31 PROCEDURE — 80048 BASIC METABOLIC PNL TOTAL CA: CPT

## 2025-07-31 PROCEDURE — 2500000003 HC RX 250 WO HCPCS: Performed by: STUDENT IN AN ORGANIZED HEALTH CARE EDUCATION/TRAINING PROGRAM

## 2025-07-31 PROCEDURE — 85027 COMPLETE CBC AUTOMATED: CPT

## 2025-07-31 RX ORDER — POTASSIUM CHLORIDE 1500 MG/1
40 TABLET, EXTENDED RELEASE ORAL ONCE
Status: COMPLETED | OUTPATIENT
Start: 2025-07-31 | End: 2025-07-31

## 2025-07-31 RX ORDER — POTASSIUM CHLORIDE 7.45 MG/ML
10 INJECTION INTRAVENOUS
Status: COMPLETED | OUTPATIENT
Start: 2025-07-31 | End: 2025-07-31

## 2025-07-31 RX ORDER — METOPROLOL SUCCINATE 50 MG/1
100 TABLET, EXTENDED RELEASE ORAL DAILY
Status: DISCONTINUED | OUTPATIENT
Start: 2025-07-31 | End: 2025-08-02 | Stop reason: HOSPADM

## 2025-07-31 RX ORDER — SODIUM CHLORIDE 9 MG/ML
INJECTION, SOLUTION INTRAVENOUS CONTINUOUS
Status: DISCONTINUED | OUTPATIENT
Start: 2025-07-31 | End: 2025-08-01

## 2025-07-31 RX ORDER — GLUCAGON 1 MG/ML
1 KIT INJECTION PRN
Status: DISCONTINUED | OUTPATIENT
Start: 2025-07-31 | End: 2025-08-02 | Stop reason: HOSPADM

## 2025-07-31 RX ORDER — MAGNESIUM SULFATE IN WATER 40 MG/ML
2000 INJECTION, SOLUTION INTRAVENOUS ONCE
Status: COMPLETED | OUTPATIENT
Start: 2025-07-31 | End: 2025-07-31

## 2025-07-31 RX ORDER — SODIUM CHLORIDE, SODIUM LACTATE, POTASSIUM CHLORIDE, CALCIUM CHLORIDE 600; 310; 30; 20 MG/100ML; MG/100ML; MG/100ML; MG/100ML
INJECTION, SOLUTION INTRAVENOUS CONTINUOUS
Status: DISCONTINUED | OUTPATIENT
Start: 2025-07-31 | End: 2025-07-31

## 2025-07-31 RX ORDER — SODIUM CHLORIDE, SODIUM LACTATE, POTASSIUM CHLORIDE, AND CALCIUM CHLORIDE .6; .31; .03; .02 G/100ML; G/100ML; G/100ML; G/100ML
500 INJECTION, SOLUTION INTRAVENOUS ONCE
Status: COMPLETED | OUTPATIENT
Start: 2025-07-31 | End: 2025-07-31

## 2025-07-31 RX ORDER — CLONIDINE HYDROCHLORIDE 0.1 MG/1
0.2 TABLET ORAL 3 TIMES DAILY
Status: DISCONTINUED | OUTPATIENT
Start: 2025-07-31 | End: 2025-08-01

## 2025-07-31 RX ORDER — INSULIN LISPRO 100 [IU]/ML
0-8 INJECTION, SOLUTION INTRAVENOUS; SUBCUTANEOUS
Status: DISCONTINUED | OUTPATIENT
Start: 2025-07-31 | End: 2025-08-02 | Stop reason: HOSPADM

## 2025-07-31 RX ORDER — MAGNESIUM SULFATE IN WATER 40 MG/ML
2000 INJECTION, SOLUTION INTRAVENOUS PRN
Status: DISCONTINUED | OUTPATIENT
Start: 2025-07-31 | End: 2025-08-02 | Stop reason: HOSPADM

## 2025-07-31 RX ORDER — SODIUM BICARBONATE 650 MG/1
650 TABLET ORAL 4 TIMES DAILY
Status: COMPLETED | OUTPATIENT
Start: 2025-07-31 | End: 2025-08-01

## 2025-07-31 RX ORDER — DEXTROSE MONOHYDRATE 100 MG/ML
INJECTION, SOLUTION INTRAVENOUS CONTINUOUS PRN
Status: DISCONTINUED | OUTPATIENT
Start: 2025-07-31 | End: 2025-07-31

## 2025-07-31 RX ORDER — POTASSIUM CHLORIDE 7.45 MG/ML
10 INJECTION INTRAVENOUS PRN
Status: DISCONTINUED | OUTPATIENT
Start: 2025-07-31 | End: 2025-08-02 | Stop reason: HOSPADM

## 2025-07-31 RX ORDER — INSULIN GLARGINE 100 [IU]/ML
15 INJECTION, SOLUTION SUBCUTANEOUS 2 TIMES DAILY
Status: DISCONTINUED | OUTPATIENT
Start: 2025-07-31 | End: 2025-08-02 | Stop reason: HOSPADM

## 2025-07-31 RX ADMIN — SODIUM CHLORIDE: 0.9 INJECTION, SOLUTION INTRAVENOUS at 10:20

## 2025-07-31 RX ADMIN — POTASSIUM CHLORIDE 10 MEQ: 7.46 INJECTION, SOLUTION INTRAVENOUS at 08:32

## 2025-07-31 RX ADMIN — POTASSIUM CHLORIDE 10 MEQ: 7.46 INJECTION, SOLUTION INTRAVENOUS at 10:44

## 2025-07-31 RX ADMIN — INSULIN LISPRO 4 UNITS: 100 INJECTION, SOLUTION INTRAVENOUS; SUBCUTANEOUS at 11:04

## 2025-07-31 RX ADMIN — ATORVASTATIN CALCIUM 20 MG: 20 TABLET, FILM COATED ORAL at 21:31

## 2025-07-31 RX ADMIN — POTASSIUM CHLORIDE 10 MEQ: 7.46 INJECTION, SOLUTION INTRAVENOUS at 11:48

## 2025-07-31 RX ADMIN — ASPIRIN 81 MG: 81 TABLET, CHEWABLE ORAL at 08:35

## 2025-07-31 RX ADMIN — INSULIN GLARGINE 15 UNITS: 100 INJECTION, SOLUTION SUBCUTANEOUS at 08:41

## 2025-07-31 RX ADMIN — SODIUM BICARBONATE: 84 INJECTION, SOLUTION INTRAVENOUS at 04:17

## 2025-07-31 RX ADMIN — CLONIDINE HYDROCHLORIDE 0.3 MG: 0.2 TABLET ORAL at 08:34

## 2025-07-31 RX ADMIN — INSULIN LISPRO 2 UNITS: 100 INJECTION, SOLUTION INTRAVENOUS; SUBCUTANEOUS at 05:08

## 2025-07-31 RX ADMIN — HEPARIN SODIUM 5000 UNITS: 5000 INJECTION, SOLUTION INTRAVENOUS; SUBCUTANEOUS at 15:47

## 2025-07-31 RX ADMIN — POTASSIUM CHLORIDE 10 MEQ: 7.46 INJECTION, SOLUTION INTRAVENOUS at 07:10

## 2025-07-31 RX ADMIN — POTASSIUM CHLORIDE 40 MEQ: 1500 TABLET, EXTENDED RELEASE ORAL at 02:07

## 2025-07-31 RX ADMIN — MONTELUKAST SODIUM 5 MG: 5 TABLET, CHEWABLE ORAL at 21:32

## 2025-07-31 RX ADMIN — SODIUM CHLORIDE, PRESERVATIVE FREE 10 ML: 5 INJECTION INTRAVENOUS at 21:39

## 2025-07-31 RX ADMIN — INSULIN LISPRO 4 UNITS: 100 INJECTION, SOLUTION INTRAVENOUS; SUBCUTANEOUS at 17:33

## 2025-07-31 RX ADMIN — POTASSIUM PHOSPHATE, MONOBASIC 250 MG: 500 TABLET, SOLUBLE ORAL at 11:04

## 2025-07-31 RX ADMIN — INSULIN LISPRO 2 UNITS: 100 INJECTION, SOLUTION INTRAVENOUS; SUBCUTANEOUS at 21:33

## 2025-07-31 RX ADMIN — MAGNESIUM SULFATE HEPTAHYDRATE 2000 MG: 40 INJECTION, SOLUTION INTRAVENOUS at 07:13

## 2025-07-31 RX ADMIN — POTASSIUM CHLORIDE 10 MEQ: 7.46 INJECTION, SOLUTION INTRAVENOUS at 09:43

## 2025-07-31 RX ADMIN — SODIUM BICARBONATE 650 MG: 650 TABLET ORAL at 21:31

## 2025-07-31 RX ADMIN — CLONIDINE HYDROCHLORIDE 0.2 MG: 0.1 TABLET ORAL at 21:30

## 2025-07-31 RX ADMIN — SODIUM CHLORIDE, SODIUM LACTATE, POTASSIUM CHLORIDE, AND CALCIUM CHLORIDE 500 ML: .6; .31; .03; .02 INJECTION, SOLUTION INTRAVENOUS at 07:03

## 2025-07-31 RX ADMIN — METOPROLOL SUCCINATE 100 MG: 50 TABLET, EXTENDED RELEASE ORAL at 08:36

## 2025-07-31 RX ADMIN — INSULIN GLARGINE 15 UNITS: 100 INJECTION, SOLUTION SUBCUTANEOUS at 21:34

## 2025-07-31 RX ADMIN — HEPARIN SODIUM 5000 UNITS: 5000 INJECTION, SOLUTION INTRAVENOUS; SUBCUTANEOUS at 05:08

## 2025-07-31 RX ADMIN — HEPARIN SODIUM 5000 UNITS: 5000 INJECTION, SOLUTION INTRAVENOUS; SUBCUTANEOUS at 21:33

## 2025-07-31 RX ADMIN — AMLODIPINE BESYLATE 10 MG: 5 TABLET ORAL at 08:36

## 2025-07-31 RX ADMIN — SODIUM CHLORIDE, PRESERVATIVE FREE 10 ML: 5 INJECTION INTRAVENOUS at 08:40

## 2025-07-31 RX ADMIN — POTASSIUM PHOSPHATE, MONOBASIC 250 MG: 500 TABLET, SOLUBLE ORAL at 08:35

## 2025-07-31 RX ADMIN — ONDANSETRON 4 MG: 2 INJECTION, SOLUTION INTRAMUSCULAR; INTRAVENOUS at 02:29

## 2025-07-31 RX ADMIN — CLONIDINE HYDROCHLORIDE 0.2 MG: 0.1 TABLET ORAL at 15:47

## 2025-07-31 ASSESSMENT — PAIN SCALES - GENERAL
PAINLEVEL_OUTOF10: 0

## 2025-07-31 ASSESSMENT — ENCOUNTER SYMPTOMS
DIARRHEA: 0
BACK PAIN: 0
ABDOMINAL PAIN: 0
SHORTNESS OF BREATH: 0
COUGH: 0
CONSTIPATION: 0
COLOR CHANGE: 0

## 2025-07-31 NOTE — PROGRESS NOTES
CRITICAL CARE PROGRESS NOTE    Name: Geetha Emery   : 1960   MRN: [id number]   Date: 2025      Diagnoses/problem list:   Metabolic acidosis  Mild DKA  Lactic acidosis, metformin induced?  Uncontrolled diabetes  DASIA  History of HTN    HPI & Hospital Course:   65-year-old female with history of DM, HTN, came to the hospital after a mechanical fall. She was cleaning her house and slipped from her rug. Could not stand up afterwards. Called EMS. In ED, hemodynamically stable. Poor historian but complains of left knee pain from the fall. Labs with glucose 590, Na 132, K 3.4, CO2 of 18, AG 18, Cr of 1.8 and lactate of 4.2. ICU was consulted for suspicion of DKA vs metformin induced lactic acidosis.     24-hour events:   No issues overnight. Feeling better. Did not require insulin drip. PO diet. Transfer to medical floor.     ROS negative except as otherwise documented.     Assessment and plan:     1. Metabolic acidosis  - Presented with Glu 590, Anion gap 18, pH 7.50  - Unsure if she has DKA vs metformin induced lactic acidosis  - Pending betahydroxy, A1c   - Needed bicarb drip  - Resolved      2. Uncontrolled DM / Mild DKA  - Unsure if she has DKA vs metformin induced acidosis  - Needed lantus and sliding scale  - Insulin drip was not started given hypokalemia      3. DASIA  - No known renal issues   - Serum Cr of 1.2  - Hold metformin     4. History of HTN  - Resume amlodipine & clonidine.      DAILY CHECKLIST     Code Status: Full code  DVT Prophylaxis: SCDs/Hep sq  GI Prophylaxis: Not needed  Feeding: PO diet  ABCDEF Bundle/Checklist Completed: Yes  Disposition: Transfer to medical floor  Social: Discussed with patient    OBJECTIVE:     Blood pressure (!) 127/48, pulse 83, temperature 98.1 °F (36.7 °C), temperature source Oral, resp. rate 23, height 1.676 m (5' 6\"), weight 116.1 kg (256 lb), SpO2 100%.    Physical Exam  General: Well obese appearing, in no distress.    Skin: No rash  Head: Normocephalic,

## 2025-07-31 NOTE — CARE COORDINATION
07/31/25 0922   Service Assessment   Patient Orientation Alert and Oriented   Cognition Alert   History Provided By Patient   Primary Caregiver Self   Accompanied By/Relationship Pt alone.   Support Systems Family Members   Patient's Healthcare Decision Maker is: Legal Next of Kin   PCP Verified by CM Yes  (Berkley Rodarte)   Last Visit to PCP Within last 3 months   Prior Functional Level Independent in ADLs/IADLs   Current Functional Level Independent in ADLs/IADLs   Can patient return to prior living arrangement Yes   Ability to make needs known: Fair   Family able to assist with home care needs: Yes   Would you like for me to discuss the discharge plan with any other family members/significant others, and if so, who? Yes  (Sister Angy/Patt.)   Financial Resources Medicare   Community Resources None   CM/SW Referral Other (see comment)  (None)   Social/Functional History   Lives With Family  (Lives with brother.)   Type of Home House   Home Layout One level   Home Access Stairs to enter with rails   Entrance Stairs - Number of Steps 2   Bathroom Shower/Tub Tub/Shower unit   Bathroom Toilet Standard   Bathroom Equipment None   Bathroom Accessibility Accessible   Home Equipment None   Receives Help From Family   Prior Level of Assist for ADLs Independent   Prior Level of Assist for Homemaking Independent   Homemaking Responsibilities Yes   Ambulation Assistance Independent   Prior Level of Assist for Transfers Independent   Active  No   Occupation Retired;On disability   Discharge Planning   Type of Residence House   Living Arrangements Family Members   Current Services Prior To Admission None   Potential Assistance Needed N/A   DME Ordered? No   Potential Assistance Purchasing Medications No   Type of Home Care Services None   Patient expects to be discharged to: House   One/Two Story Residence One story   History of falls? 1  (Fell 2 days ago.)   Services At/After Discharge   Transition of Care Consult

## 2025-07-31 NOTE — CONSULTS
Nephrology Consultation          Patient: Geetha Emery MRN: [id number]  SSN: [id number]    YOB: 1960  Age: 65 y.o.  Sex: female      Subjective:   Reason for the consultation.  Acute kidney injury/lactic acidosis    History of present illness.  The patient is 65-year-old woman with underlying history of diabetes mellitus type 2, hypertension was hospitalized post mechanical fall.  On arrival in the ER blood pressures were soft to low.  She was afebrile.  CBC showed no leukocytosis.  Initial chemistry elevated serum creatinine 1.8/CO2 18/anion gap 18/lactic acid 4.1/blood sugar 552.  She was on metformin at home.  She did receive total of 3.5 L fluid as boluses and continued on maintenance IV fluids.  Her creatinine did improve down to 1.05 but with persistent lactic acidosis which prompted nephrology consultation.  No nausea vomiting or loose stools.  No lower extremity swelling.  No voiding issues reported.  She is on room air.          History reviewed. No pertinent past medical history.  History reviewed. No pertinent surgical history.   History reviewed. No pertinent family history.  Social History     Tobacco Use    Smoking status: Never    Smokeless tobacco: Never   Substance Use Topics    Alcohol use: Never      Current Facility-Administered Medications   Medication Dose Route Frequency Provider Last Rate Last Admin    dextrose bolus 10% 125 mL  125 mL IntraVENous PRN Jono Trevino, APRN - CNP        Or    dextrose bolus 10% 250 mL  250 mL IntraVENous PRN Matdarius Tsana N, APRN - CNP        potassium chloride 10 mEq/100 mL IVPB (Peripheral Line)  10 mEq IntraVENous PRN Jono Trevino N, APRN -  mL/hr at 07/31/25 1238 Rate Verify at 07/31/25 1238    magnesium sulfate 2000 mg in 50 mL IVPB premix  2,000 mg IntraVENous PRN MatJono mccoy N, APRN - CNP        sodium phosphate 15 mmol in sodium chloride 0.9 % 250 mL IVPB  15 mmol IntraVENous PRN Jono Trevino, APRN - CNP         hydration    Thank you for the consultation.  Plan:       Signed By: Sherly Weir MD     July 31, 2025

## 2025-07-31 NOTE — CONSULTS
Hospitalist Admission Note    NAME: Geetha Emery   :  1960   MRN:  [id number]     Date/Time:  2025 2:01 PM    Patient PCP: Berkley Rodarte MD    ______________________________________________________________________  Given the patient's current clinical presentation in regards to the patient's multiple medical problems, complex decision making was performed, which includes reviewing the patient's available past medical records, laboratory results, and diagnostic studies.       My assessment of this patient's clinical condition and my plan of care is as follows.    Assessment / Plan:  Metabolic acidosis  Uncontrolled type 2 diabetes  DKA versus metformin induced lactic acidosis  Admitted to ICU, initially unsure if lactic acidosis was secondary to metformin use versus anion gap metabolic acidosis due to DKA.  Patient received Lantus, sliding scale, bicarb drip and IV fluid boluses.  Patient was hypokalemic, started on Lantus rather than insulin drip.  Improved from 593 to now maintaining around 220- 250.  A1c 14.2  Beta-hydroxybutyrate pending  UA with ketones  Lactic acid 4.1 on admission, improved to 2.6  Recheck BMP, lactic.  If worsening anion gap or significantly elevated beta hydroxybutyrate consider transferring back to ICU for insulin drip.  Continue Lantus, insulin sliding scale with Accu-Cheks    DASIA  -Resolving, admission creatinine 1.8-today 1.05  Received 2.5 L fluid boluses  On maintenance fluid  Nephrology following    Fall  Knee xray shows no acute abnormalities.    Hypertension  Continue home amlodipine, clonidine, metoprolol,   Medical Decision Making:   I personally reviewed labs: cbc, bmp, ua  I personally reviewed imaging:cxr, knee xr  Toxic drug monitoring: insulin for hypoglycemia, monitor accucheck  Discussed case with: Attending physician Manas      Code Status: full  DVT Prophylaxis: heparin  GI Prophylaxis:none indicated      Subjective:   CHIEF COMPLAINT: Fall,    amLODIPine (NORVASC) 10 MG tablet Take 1 tablet by mouth daily 7/29/25  Yes Tato Ojeda MD   atorvastatin (LIPITOR) 20 MG tablet Take 1 tablet by mouth every evening 7/1/25  Yes Tato Ojeda MD   cloNIDine (CATAPRES) 0.3 MG tablet Take 1 tablet by mouth 3 times daily 7/1/25  Yes Tato Ojeda MD   LANTUS SOLOSTAR 100 UNIT/ML injection pen Inject 10 Units into the skin 2 times daily 7/29/25  Yes Tato Ojeda MD   aspirin 81 MG EC tablet Take 1 tablet by mouth daily   Yes Tato Ojeda MD   chlorthalidone (HYGROTON) 25 MG tablet Take 1 tablet by mouth daily   Yes ProviderTato MD   insulin lispro (HUMALOG) 100 UNIT/ML SOLN injection vial Inject 10 Units into the skin 3 times daily (with meals)   Yes ProviderTato MD         Objective:   VITALS:    Patient Vitals for the past 24 hrs:   BP Temp Temp src Pulse Resp SpO2   07/31/25 1330 109/71 97.9 °F (36.6 °C) Oral 85 18 98 %   07/31/25 1100 139/68 97.3 °F (36.3 °C) Oral 86 15 100 %   07/31/25 1000 (!) 127/48 -- -- 83 23 --   07/31/25 0900 (!) 122/57 -- -- 88 12 100 %   07/31/25 0834 (!) 149/76 -- -- (!) 106 -- --   07/31/25 0800 (!) 149/76 -- -- (!) 111 18 100 %   07/31/25 0700 (!) 144/73 98.1 °F (36.7 °C) Oral (!) 107 18 --   07/31/25 0600 (!) 145/69 -- -- 99 18 --   07/31/25 0500 127/74 -- -- 88 19 --   07/31/25 0400 (!) 144/72 97.6 °F (36.4 °C) Oral (!) 115 19 96 %   07/31/25 0300 (!) 149/80 -- -- 100 18 99 %   07/31/25 0200 (!) 155/94 -- -- (!) 114 18 --   07/31/25 0100 125/70 -- -- 95 21 99 %   07/31/25 0000 133/64 97.9 °F (36.6 °C) Oral 91 16 99 %   07/30/25 2336 133/64 -- -- -- -- --   07/30/25 2300 -- 97.9 °F (36.6 °C) Oral 92 12 99 %   07/30/25 2200 -- -- -- 82 17 100 %   07/30/25 2117 (!) 153/75 -- -- -- -- --   07/30/25 2100 -- -- -- 85 15 100 %   07/30/25 2000 -- 98.2 °F (36.8 °C) Oral 85 17 100 %   07/30/25 1924 -- 98.2 °F (36.8 °C) -- -- -- --   07/30/25 1907 (!) 168/65 -- -- -- -- --   07/30/25

## 2025-07-31 NOTE — PLAN OF CARE
Problem: Chronic Conditions and Co-morbidities  Goal: Patient's chronic conditions and co-morbidity symptoms are monitored and maintained or improved  Outcome: Progressing     Problem: Discharge Planning  Goal: Discharge to home or other facility with appropriate resources  Outcome: Progressing     Problem: Safety - Adult  Goal: Free from fall injury  Outcome: Progressing     Problem: Respiratory - Adult  Goal: Achieves optimal ventilation and oxygenation  Outcome: Progressing     Problem: Cardiovascular - Adult  Goal: Maintains optimal cardiac output and hemodynamic stability  Outcome: Progressing  Goal: Absence of cardiac dysrhythmias or at baseline  Outcome: Progressing     Problem: Skin/Tissue Integrity - Adult  Goal: Skin integrity remains intact  Outcome: Progressing     Problem: Musculoskeletal - Adult  Goal: Return mobility to safest level of function  Outcome: Progressing  Goal: Return ADL status to a safe level of function  Outcome: Progressing     Problem: Gastrointestinal - Adult  Goal: Minimal or absence of nausea and vomiting  Outcome: Progressing  Goal: Maintains adequate nutritional intake  Outcome: Progressing     Problem: Metabolic/Fluid and Electrolytes - Adult  Goal: Electrolytes maintained within normal limits  Outcome: Progressing  Goal: Hemodynamic stability and optimal renal function maintained  Outcome: Progressing

## 2025-07-31 NOTE — PROGRESS NOTES
Spiritual Health History and Assessment/Progress Note  [facility]    (P) Initial Encounter,  ,  ,      Name: Geetha Emery MRN: [id number]    Age: 65 y.o.     Sex: female   Language: English   Pentecostal: None   Secondary DM with DKA, uncontrolled (HCC)     Date: 7/31/2025            Total Time Calculated: (P) 30 min              Spiritual Assessment began in [facility]        Referral/Consult From: (P) Rounding   Encounter Overview/Reason: (P) Initial Encounter  Service Provided For: (P) Patient    Martita, Belief, Meaning:   Patient identifies as spiritual, is connected with a martita tradition or spiritual practice, and has beliefs or practices that help with coping during difficult times  Family/Friends No family/friends present      Importance and Influence:  Patient has spiritual/personal beliefs that influence decisions regarding their health  Family/Friends No family/friends present    Community:  Patient is connected with a spiritual community and feels well-supported. Support system includes: [place] Community and Extended family  Family/Friends No family/friends present    Assessment and Plan of Care:     Patient Interventions include: Facilitated expression of thoughts and feelings  Family/Friends Interventions include: No family/friends present    Patient Plan of Care: Spiritual Care available upon further referral  Family/Friends Plan of Care: No family/friends present    Electronically signed by Maulik Rebolledo Jr, Chaplain Resident on 7/31/2025 at 12:03 PM

## 2025-08-01 LAB
ALBUMIN SERPL-MCNC: 2.5 G/DL (ref 3.5–5)
ANION GAP SERPL CALC-SCNC: 9 MMOL/L (ref 2–12)
BUN SERPL-MCNC: 9 MG/DL (ref 6–20)
BUN/CREAT SERPL: 9 (ref 12–20)
CA-I BLD-MCNC: 8.8 MG/DL (ref 8.5–10.1)
CHLORIDE SERPL-SCNC: 103 MMOL/L (ref 97–108)
CO2 SERPL-SCNC: 21 MMOL/L (ref 21–32)
CREAT SERPL-MCNC: 1.03 MG/DL (ref 0.55–1.02)
ERYTHROCYTE [DISTWIDTH] IN BLOOD BY AUTOMATED COUNT: 14.5 % (ref 11.5–14.5)
GLUCOSE BLD STRIP.AUTO-MCNC: 160 MG/DL (ref 65–100)
GLUCOSE BLD STRIP.AUTO-MCNC: 202 MG/DL (ref 65–100)
GLUCOSE BLD STRIP.AUTO-MCNC: 217 MG/DL (ref 65–100)
GLUCOSE BLD STRIP.AUTO-MCNC: 277 MG/DL (ref 65–100)
GLUCOSE BLD STRIP.AUTO-MCNC: 301 MG/DL (ref 65–100)
GLUCOSE SERPL-MCNC: 205 MG/DL (ref 65–100)
HCT VFR BLD AUTO: 43.6 % (ref 35–47)
HGB BLD-MCNC: 14.2 G/DL (ref 11.5–16)
LACTATE SERPL-SCNC: 1.8 MMOL/L (ref 0.4–2)
MCH RBC QN AUTO: 28 PG (ref 26–34)
MCHC RBC AUTO-ENTMCNC: 32.6 G/DL (ref 30–36.5)
MCV RBC AUTO: 86 FL (ref 80–99)
NRBC # BLD: 0 K/UL (ref 0–0.01)
NRBC BLD-RTO: 0 PER 100 WBC
OSMOLALITY SERPL: 299 MOSM/KG H2O
PERFORMED BY:: ABNORMAL
PHOSPHATE SERPL-MCNC: 3.1 MG/DL (ref 2.6–4.7)
PLATELET # BLD AUTO: 285 K/UL (ref 150–400)
PMV BLD AUTO: 9.7 FL (ref 8.9–12.9)
POTASSIUM SERPL-SCNC: ABNORMAL MMOL/L (ref 3.5–5.1)
RBC # BLD AUTO: 5.07 M/UL (ref 3.8–5.2)
SODIUM SERPL-SCNC: 133 MMOL/L (ref 136–145)
WBC # BLD AUTO: 6.4 K/UL (ref 3.6–11)

## 2025-08-01 PROCEDURE — 36415 COLL VENOUS BLD VENIPUNCTURE: CPT

## 2025-08-01 PROCEDURE — 97165 OT EVAL LOW COMPLEX 30 MIN: CPT

## 2025-08-01 PROCEDURE — 6370000000 HC RX 637 (ALT 250 FOR IP): Performed by: INTERNAL MEDICINE

## 2025-08-01 PROCEDURE — 6370000000 HC RX 637 (ALT 250 FOR IP): Performed by: NURSE PRACTITIONER

## 2025-08-01 PROCEDURE — 85027 COMPLETE CBC AUTOMATED: CPT

## 2025-08-01 PROCEDURE — 82962 GLUCOSE BLOOD TEST: CPT

## 2025-08-01 PROCEDURE — 83605 ASSAY OF LACTIC ACID: CPT

## 2025-08-01 PROCEDURE — 6360000002 HC RX W HCPCS: Performed by: STUDENT IN AN ORGANIZED HEALTH CARE EDUCATION/TRAINING PROGRAM

## 2025-08-01 PROCEDURE — 6370000000 HC RX 637 (ALT 250 FOR IP): Performed by: HOSPITALIST

## 2025-08-01 PROCEDURE — 2580000003 HC RX 258: Performed by: STUDENT IN AN ORGANIZED HEALTH CARE EDUCATION/TRAINING PROGRAM

## 2025-08-01 PROCEDURE — 6370000000 HC RX 637 (ALT 250 FOR IP): Performed by: STUDENT IN AN ORGANIZED HEALTH CARE EDUCATION/TRAINING PROGRAM

## 2025-08-01 PROCEDURE — 97530 THERAPEUTIC ACTIVITIES: CPT

## 2025-08-01 PROCEDURE — 2500000003 HC RX 250 WO HCPCS: Performed by: STUDENT IN AN ORGANIZED HEALTH CARE EDUCATION/TRAINING PROGRAM

## 2025-08-01 PROCEDURE — 97161 PT EVAL LOW COMPLEX 20 MIN: CPT

## 2025-08-01 PROCEDURE — 80069 RENAL FUNCTION PANEL: CPT

## 2025-08-01 PROCEDURE — 1100000000 HC RM PRIVATE

## 2025-08-01 RX ORDER — METOPROLOL SUCCINATE 100 MG/1
100 TABLET, EXTENDED RELEASE ORAL DAILY
Qty: 30 TABLET | Refills: 0 | Status: SHIPPED | OUTPATIENT
Start: 2025-08-02 | End: 2025-08-02

## 2025-08-01 RX ORDER — CLONIDINE HYDROCHLORIDE 0.1 MG/1
0.1 TABLET ORAL 3 TIMES DAILY
Status: DISCONTINUED | OUTPATIENT
Start: 2025-08-01 | End: 2025-08-02 | Stop reason: HOSPADM

## 2025-08-01 RX ORDER — CLONIDINE HYDROCHLORIDE 0.1 MG/1
0.3 TABLET ORAL 3 TIMES DAILY
Qty: 30 TABLET | Refills: 0 | Status: SHIPPED | OUTPATIENT
Start: 2025-08-01 | End: 2025-08-02

## 2025-08-01 RX ORDER — INSULIN GLARGINE 100 [IU]/ML
15 INJECTION, SOLUTION SUBCUTANEOUS 2 TIMES DAILY
Qty: 5 ADJUSTABLE DOSE PRE-FILLED PEN SYRINGE | Refills: 0 | Status: SHIPPED | OUTPATIENT
Start: 2025-08-01 | End: 2025-08-02

## 2025-08-01 RX ADMIN — SODIUM BICARBONATE 650 MG: 650 TABLET ORAL at 13:32

## 2025-08-01 RX ADMIN — SODIUM CHLORIDE: 0.9 INJECTION, SOLUTION INTRAVENOUS at 08:31

## 2025-08-01 RX ADMIN — HEPARIN SODIUM 5000 UNITS: 5000 INJECTION, SOLUTION INTRAVENOUS; SUBCUTANEOUS at 05:52

## 2025-08-01 RX ADMIN — INSULIN LISPRO 2 UNITS: 100 INJECTION, SOLUTION INTRAVENOUS; SUBCUTANEOUS at 08:24

## 2025-08-01 RX ADMIN — MONTELUKAST SODIUM 5 MG: 5 TABLET, CHEWABLE ORAL at 20:40

## 2025-08-01 RX ADMIN — AMLODIPINE BESYLATE 10 MG: 5 TABLET ORAL at 08:22

## 2025-08-01 RX ADMIN — CLONIDINE HYDROCHLORIDE 0.1 MG: 0.1 TABLET ORAL at 20:34

## 2025-08-01 RX ADMIN — INSULIN GLARGINE 15 UNITS: 100 INJECTION, SOLUTION SUBCUTANEOUS at 08:23

## 2025-08-01 RX ADMIN — SODIUM BICARBONATE 650 MG: 650 TABLET ORAL at 08:23

## 2025-08-01 RX ADMIN — INSULIN LISPRO 6 UNITS: 100 INJECTION, SOLUTION INTRAVENOUS; SUBCUTANEOUS at 11:22

## 2025-08-01 RX ADMIN — HEPARIN SODIUM 5000 UNITS: 5000 INJECTION, SOLUTION INTRAVENOUS; SUBCUTANEOUS at 13:32

## 2025-08-01 RX ADMIN — METOPROLOL SUCCINATE 100 MG: 50 TABLET, EXTENDED RELEASE ORAL at 08:22

## 2025-08-01 RX ADMIN — SODIUM CHLORIDE: 0.9 INJECTION, SOLUTION INTRAVENOUS at 00:37

## 2025-08-01 RX ADMIN — SODIUM CHLORIDE, PRESERVATIVE FREE 10 ML: 5 INJECTION INTRAVENOUS at 10:44

## 2025-08-01 RX ADMIN — CLONIDINE HYDROCHLORIDE 0.2 MG: 0.1 TABLET ORAL at 08:23

## 2025-08-01 RX ADMIN — INSULIN GLARGINE 15 UNITS: 100 INJECTION, SOLUTION SUBCUTANEOUS at 20:34

## 2025-08-01 RX ADMIN — INSULIN LISPRO 4 UNITS: 100 INJECTION, SOLUTION INTRAVENOUS; SUBCUTANEOUS at 20:32

## 2025-08-01 RX ADMIN — SODIUM BICARBONATE 650 MG: 650 TABLET ORAL at 16:53

## 2025-08-01 RX ADMIN — ASPIRIN 81 MG: 81 TABLET, CHEWABLE ORAL at 08:22

## 2025-08-01 RX ADMIN — HEPARIN SODIUM 5000 UNITS: 5000 INJECTION, SOLUTION INTRAVENOUS; SUBCUTANEOUS at 20:32

## 2025-08-01 RX ADMIN — ATORVASTATIN CALCIUM 20 MG: 20 TABLET, FILM COATED ORAL at 20:32

## 2025-08-01 NOTE — THERAPY EVALUATION
OCCUPATIONAL THERAPY EVALUATION AND DISCHARGE  Patient: Geetha Emery (65 y.o. female)  Date: 8/1/2025  Primary Diagnosis: Secondary DM with DKA, uncontrolled (HCC) [E13.10]       Precautions: None                Recommendations for nursing mobility: Out of bed to chair for meals, Encourage HEP in prep for ADLs/mobility; see handout for details, Frequent repositioning to prevent skin breakdown, AD and gt belt for bed to chair , Amb to bathroom with AD and gait belt, and Assist x1    In place during session:Peripheral IV  ASSESSMENT  Pt is a 65 y.o. female presenting to [facility] with c/o hyperglycemia and a fall, admitted 7/30/2025 and currently being treated for metabolic acidosis, uncontrolled DM 2, DKA, DASIA, fall, HTN. Pt received semi-supine in bed upon arrival, AXO x 4, and agreeable to OT evaluation.     Based on the objective data described below pt currently present at baseline IND for ADLs and function mobility/transfers at this time. (See below for objective details and assist levels).     Overall pt tolerated session well today with no c/o pain. Bed mobility, OOB functional mobility, and standing grooming completed IND.  Pt has no skilled acute OT needs at this time either noted by OT or reported by pt, will DC skilled OT following evaluation; pt verbalized understanding and agreement.  Current OT DC recommendation No post acute skilled occupational therapy, return to previous living settingonce medically appropriate.       PLAN :  Recommendations and Planned Interventions: DC from skilled OT services following evaluation.     Rationale for discharge: Patient currently at functional baseline for transfers/mobility, no further skilled therapy required at this time    Frequency/Duration: DC from OT services following evaluation due to Patient currently at functional baseline for transfers/mobility, no further skilled therapy required at this time; no skilled therapy required during admission, please reorder if

## 2025-08-01 NOTE — PLAN OF CARE
Problem: Safety - Adult  Goal: Free from fall injury  7/31/2025 2257 by Nicole Purdy, RN  Outcome: Progressing  7/31/2025 1821 by Jazzy Downey, RN  Outcome: Progressing

## 2025-08-01 NOTE — DISCHARGE SUMMARY
Discharge Summary    Name: Geetha Emery  [id number]  YOB: 1960 (Age: 65 y.o.)   Date of Admission: 7/30/2025  Date of Discharge: 8/1/2025  Attending Physician: Sony Cuevas MD    Discharge Diagnosis:   Principal Problem:    Secondary DM with DKA, uncontrolled ([facility])  Resolved Problems:    * No resolved hospital problems. *  Metabolic acidosis  Lactic acidosis  DASIA, resolved  Fall  Hypertension  Consultations:  IP CONSULT TO DIABETES EDUCATOR      Brief Admission History/Reason for Admission Per Surjit Malagon MD:   Uncontrolled type 2 diabetes with lactic acidosis    Brief Hospital Course by Main Problems:   Geetha Emery is a 65 y.o.  female with PMHx significant for type 2 diabetes, hypertension, hyperlipidemia admitted 7/30/2025 to ICU for metformin induced lactic acidosis versus DKA.  Patient who presented to the ED because she fell and hit her knee.  Patient's initial lab results showed blood glucose of 593, lactic 4.1, anion gap 18, CO2 18.  Creatinine 1.8.  Patient transferred to ICU, started on bicarb drip and given high dose of the Lantus.  As potassium was 3.4 insulin drip was held off.  Aggressive IV hydration started.  Nephrology consulted for DASIA and metabolic acidosis.  Transferred out of ICU to medical floor for continued monitoring and management.  Continued IV hydration and monitoring electrolytes.  Electrolytes significantly improved this afternoon, creatinine 1.03, CO2 21, potassium 4.0, glucose 205.  Glucose also significantly improved in control.  Lactic acidosis resolved.  Patient medically cleared for discharge at this time, follow-up with PCP, endocrinologist for continued monitoring and management of type 2 diabetes.  Also prescribed new glucometer kit as patient states she did not have any more supplies/had lost her glucometer.  Will discontinue metformin for now and mildly increase Lantus from 10 to 15 units at home as this is what she has  been getting here with good response.  Patient can follow-up with endocrinology for continued adjustments.    Discharge Exam:  Patient seen and examined by me on discharge day.  Patient resting comfortably, friends in the room.  Discussed discharge recommendations including following up with PCP, endocrinology.  Also discussed at length how to administer insulin and the importance of checking her blood sugar.  Patient verbalized understanding.  RN team provided education regarding insulin administration and checking blood sugar with teach back method.  Patient verbalized understanding.  Pertinent Findings:  Patient Vitals for the past 24 hrs:   BP Temp Temp src Pulse Resp SpO2   08/01/25 1656 -- 97.5 °F (36.4 °C) Oral -- -- --   08/01/25 1530 118/70 -- Oral 81 18 99 %   08/01/25 1330 98/69 -- Temporal 86 -- --   08/01/25 0751 120/68 97.7 °F (36.5 °C) Oral 77 16 99 %   08/01/25 0327 125/79 97.3 °F (36.3 °C) Oral 87 18 100 %   07/1960 107/66 97.5 °F (36.4 °C) Oral 83 17 99 %       Gen:    Not in distress  Chest: Clear lungs  CVS:   Regular rhythm.  No edema  Abd:  Soft, not distended, not tender  Neuro: Alert and oriented x 3    Discharge/Recent Laboratory Results:  Recent Labs     07/31/25  0815 07/31/25  1926 08/01/25  0709   *   < > 133*   K 3.8   < > Hemolyzed, Recollection Recommended   CL 96*   < > 103   CO2 26   < > 21   BUN 11   < > 9   CREATININE 1.05*   < > 1.03*   GLUCOSE 255*   < > 205*   CALCIUM 9.0   < > 8.8   PHOS 2.1*  --  3.1   MG 2.0  --   --     < > = values in this interval not displayed.     Recent Labs     08/01/25  0709   HGB 14.2   HCT 43.6   WBC 6.4          Discharge Medications:     Medication List        START taking these medications      blood glucose monitor kit and supplies  Dispense sufficient amount for indicated testing frequency plus additional to accommodate PRN testing needs. Dispense all needed supplies to include: monitor, strips, lancing device, lancets,  control solutions, alcohol swabs.     metoprolol succinate 100 MG extended release tablet  Commonly known as: TOPROL XL  Take 1 tablet by mouth daily  Start taking on: August 2, 2025            CHANGE how you take these medications      cloNIDine 0.1 MG tablet  Commonly known as: CATAPRES  Take 3 tablets by mouth 3 times daily  What changed: medication strength     Lantus SoloStar 100 UNIT/ML injection pen  Generic drug: insulin glargine  Inject 15 Units into the skin 2 times daily  What changed: how much to take            CONTINUE taking these medications      amLODIPine 10 MG tablet  Commonly known as: NORVASC     aspirin 81 MG EC tablet     atorvastatin 20 MG tablet  Commonly known as: LIPITOR     HumaLOG 100 UNIT/ML Soln injection vial  Generic drug: insulin lispro            STOP taking these medications      chlorthalidone 25 MG tablet  Commonly known as: HYGROTON     metFORMIN 1000 MG tablet  Commonly known as: GLUCOPHAGE     metoprolol 100 MG tablet  Commonly known as: LOPRESSOR               Where to Get Your Medications        These medications were sent to [facility] [address] - P [phone] - F [phone] [address] [facility] [address]      Hours: 24-hours Phone: [phone]   blood glucose monitor kit and supplies  cloNIDine 0.1 MG tablet  Lantus SoloStar 100 UNIT/ML injection pen  metoprolol succinate 100 MG extended release tablet             DISPOSITION:    Home with Family: x      Home with HH/PT/OT/RN:    SNF/LTC:    STEVE:    OTHER:            Code status: full  Recommended diet: diabetic diet  Recommended activity: activity as tolerated  Wound care: None      Follow up with:   PCP : Berkley Rodarte MD Fleenor, Ellen, MD  [address]  [facility] [address] [phone]    Follow up in 1 week(s)  Follow-up after hospitalization for management of medications and other comorbidities    Mis Freedman MD  [address] [place], [place] [address]

## 2025-08-01 NOTE — CARE COORDINATION
Met w/patient at the bedside to discuss d/c disposition.  Patient desires to go home w/HH; and not agreeable to going to a SNF.  Verbal consent given for HH referrals to be sent out.     HOLLIE Barron

## 2025-08-01 NOTE — PROGRESS NOTES
PHYSICAL THERAPY EVALUATION AND DISCHARGE  Patient: Geetha Emery (65 y.o. female)  Date: 8/1/2025  Primary Diagnosis: Secondary DM with DKA, uncontrolled ([facility]) [E13.10]       Precautions: None                      Recommendations for nursing mobility: Out of bed to chair for meals, Encourage HEP in prep for ADLs/mobility; see handout for details, LE elevation for management of edema, AD and gt belt for bed to chair , Amb to bathroom with AD and gait belt, Amb in hallway, and Assist x1    In place during session: Peripheral IV and EKG/telemetry     ASSESSMENT  Pt is a 65 y.o. female admitted on 7/30/2025 for mechanical fall ; pt currently being treated for DKA, metabolic acidosis, lactic acidosis, uncontrolled diabetes, DASIA and hx of HTN . Pt semi supine upon PT arrival, agreeable to evaluation. Pt A&O x 3 with verbal cues for time. Patient friends present for evaluation with approval of patient. L knee X ray indicates no bony abnormalities.    Based on the objective data described below pt currently present at baseline independent with intermittent use of a cane for transfers and mobility at this time. (See below for objective details and assist levels).     Overall pt tolerated session good today with no report of pain, SOB or dizziness. Patient able to perform bed mobility and transfers Independently. She ambulated 30 ft to toilet, sink and back to bed with no use of AD; demonstrating widened base of support, slow, shuffled gait and decreased bilateral step length and clearance. Patient overall demonstrating good safety, however provided with education to use cane all the time at home. Patient able to perform seated therex to include marching, long arc quads, hip abb/abd and HR/TR. Patient educated throughout for sequencing for bed mobility and transfers, therex, use of cane at home and discharge recommendations.  Pt has no skilled acute PT needs at this time noted by PT or reported by pt, will DC  Assist for Transfers: Independent  Active : No  Occupation: Retired, On disability  Critical Behavior:  Orientation  Overall Orientation Status: Within Normal Limits  Orientation Level: Disoriented to time (year)  Cognition  Overall Cognitive Status: WNL    Strength:    Strength: Generally decreased, functional    Range Of Motion:  AROM: Generally decreased, functional  PROM: Generally decreased, functional    Functional Mobility:  Bed Mobility:  Bed Mobility Training  Bed Mobility Training: Yes  Interventions: Verbal cues  Rolling: Independent  Supine to Sit: Independent  Sit to Supine: Independent  Scooting: Independent  Transfers:  Transfer Training  Transfer Training: Yes  Interventions: Verbal cues  Sit to Stand: Independent  Stand to Sit: Independent  Toilet Transfer: Independent  Balance:   Balance  Sitting: Intact;With support;Without support  Standing: Intact;With support;Without support  Wheelchair Mobility:  Wheelchair Management  Wheelchair Management: No  Ambulation/Gait Training:  Gait  Gait Training: Yes  Overall Level of Assistance: Supervision  Distance (ft): 30 Feet  Assistive Device: Gait belt  Interventions: Verbal cues;Safety awareness training  Base of Support: Widened  Speed/Emelia: Shuffled;Slow  Step Length: Right shortened;Left shortened  Gait Abnormalities: Decreased step clearance;Shuffling gait    Therapeutic Intervention provided:   bed mobility , EOB transfers, OOB transfers, amb without AD, LE therapeutic exercises, seated static and dynamic balance, and standing static and dynamic balance    Functional Measure:  [facility] AM-PAC™ “6 Clicks”         Basic Mobility Inpatient Short Form  How much difficulty does the patient currently have... Unable A Lot A Little None   1.  Turning over in bed (including adjusting bedclothes, sheets and blankets)?   [] 1   [] 2   [] 3   [x] 4   2.  Sitting down on and standing up from a chair with arms ( e.g., wheelchair, bedside commode,  etc.)   [] 1   [] 2   [] 3   [x] 4   3.  Moving from lying on back to sitting on the side of the bed?   [] 1   [] 2   [] 3   [x] 4          How much help from another person does the patient currently need... Total A Lot A Little None   4.  Moving to and from a bed to a chair (including a wheelchair)?   [] 1   [] 2   [] 3   [x] 4   5.  Need to walk in hospital room?   [] 1   [] 2   [] 3   [x] 4   6.  Climbing 3-5 steps with a railing?   [] 1   [] 2   [] 3   [] 4   © , Trustees of [facility], under license to [facility]. All rights reserved     Score:  Initial:  Most Recent: X (Date: 2025)   Interpretation of Tool:  Represents activities that are increasingly more difficult (i.e. Bed mobility, Transfers, Gait).  Score 24 23 22-20 19-15 14-10 9-7 6   Modifier CH CI CJ CK CL CM CN          Physical Therapy Evaluation Charge Determination   History Examination Presentation Decision-Making   LOW Complexity : Zero comorbidities / personal factors that will impact the outcome / POC HIGH Complexity : 4+ Standardized tests and measures addressing body structure, function, activity limitation and / or participation in recreation  LOW Complexity : Stable, uncomplicated  Outcome measure: AMPAC 6: LOW      Based on the above components, the patient evaluation is determined to be of the following complexity level: LOW      Pain Ratin/10   Pain Intervention(s):       Activity Tolerance:   WNL and SpO2 stable on room air    After treatment patient left in no apparent distress:   Bed locked and in lowest position Patient left in no apparent distress in bed, Call bell within reach, Side rails x3, and Updated patient's board on functional status and mobility recommendations and nsg updated.    COMMUNICATION/EDUCATION:   The patient’s plan of care was discussed with: Registered nurse and Case management    Patient Education  Education Given To: Patient  Education Provided: Role of Therapy;Plan of Care;Home

## 2025-08-02 VITALS
BODY MASS INDEX: 41.14 KG/M2 | WEIGHT: 256 LBS | TEMPERATURE: 97.3 F | SYSTOLIC BLOOD PRESSURE: 127 MMHG | DIASTOLIC BLOOD PRESSURE: 77 MMHG | HEART RATE: 86 BPM | HEIGHT: 66 IN | RESPIRATION RATE: 12 BRPM | OXYGEN SATURATION: 100 %

## 2025-08-02 LAB
B-OH-BUTYR SERPL-SCNC: 1.82 MMOL/L
GLUCOSE BLD STRIP.AUTO-MCNC: 178 MG/DL (ref 65–100)
GLUCOSE BLD STRIP.AUTO-MCNC: 201 MG/DL (ref 65–100)
GLUCOSE BLD STRIP.AUTO-MCNC: 292 MG/DL (ref 65–100)
PERFORMED BY:: ABNORMAL

## 2025-08-02 PROCEDURE — 82962 GLUCOSE BLOOD TEST: CPT

## 2025-08-02 PROCEDURE — 6360000002 HC RX W HCPCS: Performed by: STUDENT IN AN ORGANIZED HEALTH CARE EDUCATION/TRAINING PROGRAM

## 2025-08-02 PROCEDURE — 6370000000 HC RX 637 (ALT 250 FOR IP): Performed by: STUDENT IN AN ORGANIZED HEALTH CARE EDUCATION/TRAINING PROGRAM

## 2025-08-02 PROCEDURE — 2500000003 HC RX 250 WO HCPCS: Performed by: STUDENT IN AN ORGANIZED HEALTH CARE EDUCATION/TRAINING PROGRAM

## 2025-08-02 RX ORDER — CLONIDINE HYDROCHLORIDE 0.1 MG/1
0.1 TABLET ORAL 2 TIMES DAILY
Qty: 60 TABLET | Refills: 0 | Status: SHIPPED | OUTPATIENT
Start: 2025-08-02

## 2025-08-02 RX ORDER — INSULIN GLARGINE 100 [IU]/ML
15 INJECTION, SOLUTION SUBCUTANEOUS 2 TIMES DAILY
Qty: 5 ADJUSTABLE DOSE PRE-FILLED PEN SYRINGE | Refills: 0 | Status: SHIPPED | OUTPATIENT
Start: 2025-08-02

## 2025-08-02 RX ORDER — INSULIN LISPRO 100 [IU]/ML
5 INJECTION, SOLUTION INTRAVENOUS; SUBCUTANEOUS
Qty: 4 ADJUSTABLE DOSE PRE-FILLED PEN SYRINGE | Refills: 0 | Status: SHIPPED | OUTPATIENT
Start: 2025-08-02 | End: 2025-08-02

## 2025-08-02 RX ORDER — METOPROLOL SUCCINATE 100 MG/1
100 TABLET, EXTENDED RELEASE ORAL DAILY
Qty: 30 TABLET | Refills: 0 | Status: SHIPPED | OUTPATIENT
Start: 2025-08-02

## 2025-08-02 RX ORDER — INSULIN LISPRO 100 [IU]/ML
5 INJECTION, SOLUTION INTRAVENOUS; SUBCUTANEOUS
Qty: 4 ADJUSTABLE DOSE PRE-FILLED PEN SYRINGE | Refills: 0 | Status: SHIPPED | OUTPATIENT
Start: 2025-08-02

## 2025-08-02 RX ADMIN — METOPROLOL SUCCINATE 100 MG: 50 TABLET, EXTENDED RELEASE ORAL at 09:16

## 2025-08-02 RX ADMIN — SODIUM CHLORIDE, PRESERVATIVE FREE 10 ML: 5 INJECTION INTRAVENOUS at 09:14

## 2025-08-02 RX ADMIN — INSULIN LISPRO 2 UNITS: 100 INJECTION, SOLUTION INTRAVENOUS; SUBCUTANEOUS at 17:24

## 2025-08-02 RX ADMIN — HEPARIN SODIUM 5000 UNITS: 5000 INJECTION, SOLUTION INTRAVENOUS; SUBCUTANEOUS at 14:52

## 2025-08-02 RX ADMIN — ASPIRIN 81 MG: 81 TABLET, CHEWABLE ORAL at 09:12

## 2025-08-02 RX ADMIN — AMLODIPINE BESYLATE 10 MG: 5 TABLET ORAL at 09:16

## 2025-08-02 RX ADMIN — ONDANSETRON 4 MG: 2 INJECTION, SOLUTION INTRAMUSCULAR; INTRAVENOUS at 06:37

## 2025-08-02 RX ADMIN — INSULIN LISPRO 4 UNITS: 100 INJECTION, SOLUTION INTRAVENOUS; SUBCUTANEOUS at 11:32

## 2025-08-02 RX ADMIN — INSULIN GLARGINE 15 UNITS: 100 INJECTION, SOLUTION SUBCUTANEOUS at 09:04

## 2025-08-02 RX ADMIN — HEPARIN SODIUM 5000 UNITS: 5000 INJECTION, SOLUTION INTRAVENOUS; SUBCUTANEOUS at 06:37

## 2025-08-02 NOTE — PLAN OF CARE
Problem: Chronic Conditions and Co-morbidities  Goal: Patient's chronic conditions and co-morbidity symptoms are monitored and maintained or improved  8/2/2025 1800 by Shonna Ponce RN  Outcome: Adequate for Discharge  8/2/2025 1800 by Shonna Ponce RN  Outcome: Progressing     Problem: Discharge Planning  Goal: Discharge to home or other facility with appropriate resources  8/2/2025 1800 by Shonna Ponce RN  Outcome: Adequate for Discharge  8/2/2025 1800 by Shonna Ponce RN  Outcome: Progressing     Problem: Safety - Adult  Goal: Free from fall injury  8/2/2025 1800 by Shonna Ponce RN  Outcome: Adequate for Discharge  8/2/2025 1800 by Shonna Ponce RN  Outcome: Progressing     Problem: Respiratory - Adult  Goal: Achieves optimal ventilation and oxygenation  8/2/2025 1800 by Shonna Ponce RN  Outcome: Adequate for Discharge  8/2/2025 1800 by Shonna Ponce RN  Outcome: Progressing     Problem: Cardiovascular - Adult  Goal: Maintains optimal cardiac output and hemodynamic stability  8/2/2025 1800 by Shonna Ponce RN  Outcome: Adequate for Discharge  8/2/2025 1800 by Shonna Ponce RN  Outcome: Progressing  Goal: Absence of cardiac dysrhythmias or at baseline  8/2/2025 1800 by Shonna Ponce RN  Outcome: Adequate for Discharge  8/2/2025 1800 by Shonna Ponce RN  Outcome: Progressing     Problem: Skin/Tissue Integrity - Adult  Goal: Skin integrity remains intact  8/2/2025 1800 by Shonna Ponce RN  Outcome: Adequate for Discharge  8/2/2025 1800 by Shonna Ponce RN  Outcome: Progressing     Problem: Musculoskeletal - Adult  Goal: Return mobility to safest level of function  8/2/2025 1800 by Shonna Ponce RN  Outcome: Adequate for Discharge  8/2/2025 1800 by Shonna Ponce RN  Outcome: Progressing  Goal: Return ADL status to a safe level of function  8/2/2025 1800 by Shonna Ponce RN  Outcome: Adequate for Discharge  8/2/2025 1800 by Shonna Ponce

## 2025-08-02 NOTE — DISCHARGE SUMMARY
Discharge Summary    Name: Geetha Emery  [id number]  YOB: 1960 (Age: 65 y.o.)   Date of Admission: 7/30/2025  Date of Discharge: 8/2/2025  Attending Physician: Sony Cuevas MD    Discharge Diagnosis:   Principal Problem:    Secondary DM with DKA, uncontrolled ([facility])  Resolved Problems:    * No resolved hospital problems. *  Metabolic acidosis  Lactic acidosis  DASIA, resolved  Fall  Hypertension  Consultations:  IP CONSULT TO DIABETES EDUCATOR      Brief Admission History/Reason for Admission Per Surjit Malagon MD:   Uncontrolled type 2 diabetes with lactic acidosis    Brief Hospital Course by Main Problems:   Geetha Emery is a 65 y.o.  female with PMHx significant for type 2 diabetes, hypertension, hyperlipidemia admitted 7/30/2025 to ICU for metformin induced lactic acidosis versus DKA.  Patient who presented to the ED because she fell and hit her knee.  Patient's initial lab results showed blood glucose of 593, lactic 4.1, anion gap 18, CO2 18.  Creatinine 1.8.  Patient transferred to ICU, started on bicarb drip and given high dose of the Lantus.  As potassium was 3.4 insulin drip was held off.  Aggressive IV hydration started.  Nephrology consulted for DASIA and metabolic acidosis.  Transferred out of ICU to medical floor for continued monitoring and management.  Continued IV hydration and monitoring electrolytes.  Electrolytes significantly improved this afternoon, creatinine 1.03, CO2 21, potassium 4.0, glucose 205.  Glucose also significantly improved in control.  Lactic acidosis resolved.  Patient medically cleared for discharge at this time, follow-up with PCP, endocrinologist for continued monitoring and management of type 2 diabetes.  Also prescribed new glucometer kit as patient states she did not have any more supplies/had lost her glucometer.  Will discontinue metformin for now and mildly increase Lantus from 10 to 15 units at home as this is what she has

## 2025-08-02 NOTE — PLAN OF CARE
Problem: Discharge Planning  Goal: Discharge to home or other facility with appropriate resources  8/1/2025 2338 by Esequiel Paris RN  Outcome: Progressing  Flowsheets (Taken 8/1/2025 2030)  Discharge to home or other facility with appropriate resources: Identify barriers to discharge with patient and caregiver  8/1/2025 1700 by Shonna Ponce RN  Outcome: Progressing     Problem: Safety - Adult  Goal: Free from fall injury  8/1/2025 2338 by Esequiel Paris RN  Outcome: Progressing  8/1/2025 1700 by Shonna Ponce RN  Outcome: Progressing

## 2025-08-02 NOTE — PROGRESS NOTES
Discharge instructions reviewed with patient. Writer educated patient on usage of sliding scale and insulin administration. Patient verbalized understanding and knowledge of glucose checks and sliding scale. IV removed with no complications, tip intact. Patient discharging once medications are delivered to bedside

## 2025-08-02 NOTE — PROGRESS NOTES
Nephrology Consultation          Patient: Geetha Emery MRN: [id number]  SSN: [id number]    YOB: 1960  Age: 65 y.o.  Sex: female      Subjective:   Reason for the consultation.  Acute kidney injury/lactic acidosis    History of present illness.  The patient is 65-year-old woman with underlying history of diabetes mellitus type 2, hypertension was hospitalized post mechanical fall.  On arrival in the ER blood pressures were soft to low.  She was afebrile.  CBC showed no leukocytosis.  Initial chemistry elevated serum creatinine 1.8/CO2 18/anion gap 18/lactic acid 4.1/blood sugar 552.  She was on metformin at home.  She did receive total of 3.5 L fluid as boluses and continued on maintenance IV fluids.  Her creatinine did improve down to 1.05 but with persistent lactic acidosis which prompted nephrology consultation.  No nausea vomiting or loose stools.  No lower extremity swelling.  No voiding issues reported.  She is on room air.    8/1/25 - Patient being followed for DASIA in setting of DKA.   On room air, adequate BP. In no distress  Making urine. Reports she is able to eat and drink ok   Cr better. On saline at 125 ml per hour.  Improving creatinine.  Further IV fluids stopped.    8/2/2025-no labs this morning.  Patient stays hemodynamically stable 119/76 blood pressure.  On room air.  In no distress.  Reports making adequate urine.  Sitting up on chair and says she is ready to be discharged.    History reviewed. No pertinent past medical history.  History reviewed. No pertinent surgical history.   History reviewed. No pertinent family history.  Social History     Tobacco Use    Smoking status: Never    Smokeless tobacco: Never   Substance Use Topics    Alcohol use: Never      Current Facility-Administered Medications   Medication Dose Route Frequency Provider Last Rate Last Admin    cloNIDine (CATAPRES) tablet 0.1 mg  0.1 mg Oral TID Liborio Simon MD   0.1 mg at 08/01/25 2034    dextrose  bolus 10% 125 mL  125 mL IntraVENous PRN Jono Trevino APRN - CNP        Or    dextrose bolus 10% 250 mL  250 mL IntraVENous PRN Jono Trevino, HAILEY - CNP        potassium chloride 10 mEq/100 mL IVPB (Peripheral Line)  10 mEq IntraVENous PRN Jono Trevino APRN - CNP   Stopped at 07/31/25 1258    magnesium sulfate 2000 mg in 50 mL IVPB premix  2,000 mg IntraVENous PRN Jono Trevino, HAILEY - CNP        sodium phosphate 15 mmol in sodium chloride 0.9 % 250 mL IVPB  15 mmol IntraVENous PRN Jono Trevino, HAILEY - CNP        metoprolol succinate (TOPROL XL) extended release tablet 100 mg  100 mg Oral Daily Surjit Malagon MD   100 mg at 08/02/25 0916    insulin glargine (LANTUS) injection vial 15 Units  15 Units SubCUTAneous BID Surjit Malagon MD   15 Units at 08/02/25 0904    glucagon injection 1 mg  1 mg SubCUTAneous PRN Surjit Malagon MD        insulin lispro (HUMALOG,ADMELOG) injection vial 0-8 Units  0-8 Units SubCUTAneous 4x Daily AC & HS Surjit Malagon MD   4 Units at 08/02/25 1132    sodium chloride flush 0.9 % injection 5-40 mL  5-40 mL IntraVENous 2 times per day Surjit Malagon MD   10 mL at 08/02/25 0914    sodium chloride flush 0.9 % injection 5-40 mL  5-40 mL IntraVENous PRN Surjit Malagon MD        0.9 % sodium chloride infusion   IntraVENous PRN Surjit Malagon MD        ondansetron (ZOFRAN-ODT) disintegrating tablet 4 mg  4 mg Oral Q8H PRN Surjit Malagon MD        Or    ondansetron (ZOFRAN) injection 4 mg  4 mg IntraVENous Q6H PRN Surjit Malagon MD   4 mg at 08/02/25 0637    polyethylene glycol (GLYCOLAX) packet 17 g  17 g Oral Daily PRN Surjit Malagon MD        acetaminophen (TYLENOL) tablet 650 mg  650 mg Oral Q6H PRN Surjit Malagon MD        Or    acetaminophen (TYLENOL) suppository 650 mg  650 mg Rectal Q6H PRN Surjit Malagon MD        heparin (porcine) injection 5,000 Units  5,000 Units SubCUTAneous 3 times per day Surjit Malagon MD   5,000 Units at 08/02/25 0637    glucose chewable tablet 16 g  4 tablet Oral PRN

## 2025-08-02 NOTE — CARE COORDINATION
CM noted discharge order.     DCP is home with HH pending accepting HH agency.     This CM noted that there were no accepting HH agencies from the referrals that were sent yesterday. Additional referrals were sent this morning.     Patient states that she needs a ride. Cab to be arranged to patient to discharged home.     IMM delivered.     Transition of Care Plan:    RUR: 8  Prior Level of Functioning: INDEP  Disposition: Home  LILIYA: today  If SNF or IPR: Date FOC offered: N/a  Date FOC received: N/a  Accepting facility: N/a  Date authorization started with reference number: N/a  Date authorization received and expires: N/a  Follow up appointments: Per MD  DME needed: N/a  Transportation at discharge: Cab  IM/IMM Medicare/ letter given: yes  Is patient a [place] and connected with [facility]? N/a   If yes, was [place] transfer form completed and [facility] notified?   Caregiver Contact: Patient notified   Discharge Caregiver contacted prior to discharge? N/a  Care Conference needed? no  Barriers to discharge: none

## 2025-08-03 LAB
BACTERIA SPEC CULT: NORMAL
Lab: NORMAL

## 2025-08-11 NOTE — PROGRESS NOTES
Physician Progress Note      PATIENT:               ROSALINA NOLAN  CSN #:                  [id number]  :                       1960  ADMIT DATE:       2025 11:21 AM  DISCH DATE:        2025 6:00 PM  RESPONDING  PROVIDER #:        Deanna Helms          QUERY TEXT:    Noted documentation of metabolic encephalopathy on  by nephrology   consultant.  Based on your medical judgment, please clarify these findings and   document if any of the following are being evaluated and/or treated:    The clinical indicators include:  ED Prov: 65 y.o. female with history of poorly controlled diabetes and   hypertension presents to hospital via EMS after patient sustained a mechanical   ground-level fall today and was not able to get up on her own. Mental Status:   She is alert. Mental status is at baseline.    H&P: She was cleaning her house and slipped from her rug. Could not stand up   afterwards. Called EMS. In ED, hemodynamically stable. Oriented x 3, normal   mood and affect.     CC: Psychiatric: Oriented x 3, baseline confusion     Neph: Acute encephalopathy  Metabolic  Came with fall  No evidence of sepsis or underlying infection  Chest x-ray clear lungs  Almond urinalysis  IV hydration     Med: Psychiatric/Behavioral:  Negative for confusion.     Mental Status:   She is alert and oriented to person, place, and time.  Options provided:  -- Metabolic encephalopathy confirmed as evidenced by, Please document   evidence  -- Metabolic encephalopathy ruled out after study  -- Other - I will add my own diagnosis  -- Disagree - Not applicable / Not valid  -- Disagree - Clinically unable to determine / Unknown  -- Refer to Clinical Documentation Reviewer    PROVIDER RESPONSE TEXT:    The diagnosis of metabolic encephalopathy was confirmed as evidenced by. dka    Query created by: Agnes Hector on 2025 11:50 AM      Electronically signed by:  Deanna Helms 2025 5:19 PM